# Patient Record
Sex: MALE | Race: BLACK OR AFRICAN AMERICAN
[De-identification: names, ages, dates, MRNs, and addresses within clinical notes are randomized per-mention and may not be internally consistent; named-entity substitution may affect disease eponyms.]

---

## 2017-03-28 NOTE — PD
HPI


Chief Complaint:  Medical Clearance


Time Seen by Provider:  11:52


Travel History


International Travel<30 days:  No


Contact w/Intl Traveler<30days:  No


Traveled to known affect area:  No





History of Present Illness


HPI


Patient is a 51-year-old male presents emergency department with complaint of 

chest wall pain.  Patient states that he was doing home physical therapy 2 

weeks ago, lifting weights when he felt a pop in the right chest wall.  States 

that the pain was severe initially, but then resolved.  When he is doing his 

physical therapy he has some mild ache within that same region.  Otherwise he 

is asymptomatic.  [facility] and [facility] 

them about the pain, who referred him here for cardiac evaluation given 

complaint of chest pain.





PFSH


Past Medical History


Hx Anticoagulant Therapy:  Yes (ASA)


Arthritis:  No


Asthma:  No


Anxiety:  Yes


Depression:  Yes


Heart Rhythm Problems:  No


Cancer:  No


Cardiovascular Problems:  Yes (HTN, IRREGULAR HEART BEAT)


High Cholesterol:  No


Chest Pain:  Yes


Congestive Heart Failure:  No


COPD:  No


Cerebrovascular Accident:  No


Diabetes:  Yes


Patient Takes Glucophage:  No


Diminished Hearing:  No


Deep Vein Thrombosis:  Yes


Endocrine:  No


Fibromyalgia:  Yes


Gastrointestinal Disorders:  Yes (HX OF PROBLEMS WITH RECTAL BLEEDING)


GERD:  Yes


Genitourinary:  No


Headaches:  Yes


Hiatal Hernia:  Yes


Hypertension:  Yes


Immune Disorder:  No


Implanted Vascular Access Dvce:  No


Musculoskeletal:  Yes


Neurologic:  Yes


Psychiatric:  Yes


Reproductive:  No


Respiratory:  Yes (SINUSITIS)


Immunizations Current:  Yes


Migraines:  Yes


Seizures:  No


Sleep Apnea:  No


Ulcer:  Yes (NEWLY DIAGNOSED)


Tetanus Vaccination:  > 5 Years


Influenza Vaccination:  No





Past Surgical History


Abdominal Surgery:  Yes (ABDOMINAL  LAPAROSCOPIC )


Appendectomy:  Yes (2005)


Genitourinary Surgery:  Yes (VASECTOMY)


Other Surgery:  Yes (HEMORRHOID SURGIES 2005, 2007, 2010, 2011, SINUS SURGERY, 

RT FOOT SX)





Family History


Family Myocardial Infarction:  Yes





Social History


Alcohol Use:  Yes (occassionally )


Tobacco Use:  No


Substance Use:  No





Allergies-Medications


(Allergen,Severity, Reaction):  


Coded Allergies:  


     Gabapentin (Verified  Allergy, Intermediate, VOMITING, 3/28/17)


     Oxycodone (Verified  Allergy, Mild, ITCHY, 3/28/17)


Reported Meds & Prescriptions





Reported Meds & Active Scripts


Active


Reported


Topamax (Topiramate) 50 Mg Tab 50 Mg PO DAILY


Imitrex (Sumatriptan Succinate) 50 Mg Tab 50 Mg PO ONCE PRN


     If a satisfactory response has not been obtained at 2 hours, a second


     dose may be administered


Sennosides 8.6 Mg Tab 8.6 Mg PO HS


Lyrica (Pregabalin) 150 Mg Cap 150 Mg PO BID


Prazosin (Prazosin HCl) 1 Mg Cap 4 Mg PO HS


Paxil (Paroxetine HCl) 40 Mg Tab 40 Mg PO DAILY


Pantoprazole (Pantoprazole Sodium) 40 Mg Tab 40 Mg PO BID


Mirtazapine 15 Mg Tab 15 Mg PO HS


Mobic (Meloxicam) 15 Mg Tab 15 Mg PO DAILY


Ativan (Lorazepam) 0.5 Mg Tab 0.5 Mg PO Q8H PRN


Ketotifen Opth Drops 0.025% Drops 1 Drop EACH EYE BID PRN


Proctofoam Topical (Rectal Foam) (Pramoxine Topical (Rectal Foam)) 1% Foam 1 

Applic TOPICAL QID PRN


Hydrocodone-Acetaminophen  mg Tab 1 Tab PO Q6H PRN


Colace (Docusate Sodium) 100 Mg Cap 100 Mg PO BID


D3 2000 (Cholecalciferol) 2,000 Unit Tab 2,000 Units PO BID


Cetirizine (Cetirizine HCl) 10 Mg Tab 10 Mg PO DAILY


Carafate Liq (Sucralfate) 1 Gm/10 Ml Susp 1 Gm PO QID


     on empty stomach


Baclofen 10 Mg Tab 10 Mg PO TID








Review of Systems


Except as stated in HPI:  all other systems reviewed are Neg





Physical Exam


Narrative


GENERAL: Pleasant  male in no acute distress


SKIN: Focused skin assessment warm/dry.


HEAD: Atraumatic. Normocephalic. 


EYES: No scleral icterus. No injection or drainage. 


ENT: Mucous membranes pink and moist.


NECK: Supple


CARDIOVASCULAR: Regular rate and rhythm.  No murmur appreciated.  Reproducible 

tenderness to palpation in the right sternal margin


RESPIRATORY: No accessory muscle use. Clear to auscultation. Breath sounds 

equal bilaterally. 


GASTROINTESTINAL: Abdomen soft, non-tender, nondistended. 


MUSCULOSKELETAL: No obvious deformitiesNo edema. 


NEUROLOGICAL: Awake and alert.Normal speech.


PSYCHIATRIC: Appropriate mood and affect; insight and judgment normal.





Data


Data


Last Documented VS





Vital Signs








  Date Time  Temp Pulse Resp B/P Pulse Ox O2 Delivery O2 Flow Rate FiO2


 


3/28/17 12:02  64 17 131/86 98 Room Air  


 


3/28/17 09:55 98.2       








Orders





 Electrocardiogram (3/28/17 10:42)


Chest, Pa & Lat (3/28/17 )








MDM


Medical Decision Making


Medical Screen Exam Complete:  Yes


Emergency Medical Condition:  Yes


Medical Record Reviewed:  Yes


Differential Diagnosis


51-year-old  male here with right sided chest wall pain.  This 

is clearly musculoskeletal by history and reproducible on examination.  My 

suspicion for for rib fracture, ACS, PE or dissection is exceedingly low.


Narrative Course


Twelve-lead EKG shows sinus rhythm without notable ST abnormalities, normal 

intervals.  Chest x-ray was obtained showing no acute abnormalities.  Patient 

was reassured and discharged home and cleared to return to physical therapy.





Diagnosis





 Primary Impression:  


 Chest wall pain


Referrals:  


Primary Care Physician


as needed


Patient Instructions:  Chest Wall Pain (ED), General Instructions





***Additional Instructions:


Tylenol, ibuprofen, Aleve as needed for pain.  You may resume physical therapy.


***Med/Other Pt SpecificInfo:  No Change to Meds


Disposition:  01 DISCHARGE HOME


Condition:  Stable








Aranza Granados MD Mar 28, 2017 12:03

## 2017-03-28 NOTE — RADRPT
EXAM DATE/TIME:  03/28/2017 11:31 

 

[facility] COMPARISON:     

CHEST SINGLE AP, August 27, 2016, 10:17.

 

                     

INDICATIONS :     

Patient injuried chest two weeks ago lifting weights. Patient had chest pain this morning at physical
 therapy and the 's Administration sent him to the hospital.

                     

 

MEDICAL HISTORY :            

Gastroesophageal reflux disease. Hypertension. Bronchitis.   

 

SURGICAL HISTORY :        

Hiatal hernia repair, spinal stimulator.

 

ENCOUNTER:     

Initial                                        

 

ACUITY:     

2 weeks      

 

PAIN SCORE:     

4/10

LOCATION:     Bilateral chest 

 

FINDINGS:     

PA and lateral views of the chest. Spinal stimulator leads again seen in the mid thoracic central can
al. The lungs are clear. Cardiomediastinal silhouette within normal limits. No evidence of pleural ef
fusion or pneumothorax.

 

CONCLUSION:         No acute cardiopulmonary disease identified.

 

 

 

 Bright Grover MD on March 28, 2017 at 11:56           

Board Certified Radiologist.

 This report was verified electronically.

## 2017-03-28 NOTE — EKG
Date Performed: 03/28/2017       Time Performed: 11:09:52

 

PTAGE:      51 years

 

EKG:      Sinus rhythm 

 

 LOW QRS VOLTAGE IN PRECORDIAL LEADS INFERIOR MYOCARDIAL INFARCTION ABNORMAL ECG Compared to prior tr
acing no significant change

 

DOCTOR:   Kavita Cardenas  Interpretating Date/Time  03/28/2017 22:41:17

## 2017-04-08 NOTE — RADRPT
EXAM DATE/TIME:  04/08/2017 15:40 

 

[facility] COMPARISON:     

CT ABDOMEN & PELVIS W CONTRAST, April 07, 2016, 12:59.

 

 

INDICATIONS :     

Abdomen pain; evaluate for hernia.

                      

 

IV CONTRAST:     

100 cc Omnipaque 350 (iohexol) IV 

 

 

ORAL CONTRAST:      

No oral contrast ingested.

                      

 

RADIATION DOSE:     

11.61 CTDIvol (mGy) 

 

 

MEDICAL HISTORY :     

Hypertension. Deep venous thrombosis. 

 

SURGICAL HISTORY :      

Appendectomy. Hemorrhoidectomy.Spinal cord stimulator.

 

ENCOUNTER:      

Initial

 

ACUITY:      

1 day

 

PAIN SCALE:      

5/10

 

LOCATION:       

Bilateral  abdomen

 

TECHNIQUE:     

Volumetric scanning of the abdomen and pelvis was performed.  Using automated exposure control and ad
justment of the mA and/or kV according to patient size, radiation dose was kept as low as reasonably 
achievable to obtain optimal diagnostic quality images. 

 

FINDINGS:     

 

LOWER LUNGS:     

The visualized lower lungs are clear.

 

LIVER:     

Homogeneous density without lesion.  There is no dilation of the biliary tree.  No calcified gallston
es.

 

SPLEEN:     

Normal size without lesion.

 

PANCREAS:     

Within normal limits.

 

KIDNEYS:     

Normal in size and shape.  There is no mass, stone or hydronephrosis.

 

ADRENAL GLANDS:     

Within normal limits.

 

VASCULAR:     

There is no aortic aneurysm.

 

BOWEL/MESENTERY:     

The stomach, small bowel, and colon demonstrate no acute abnormality.  There is no free intraperitone
al air or fluid.

 

ABDOMINAL WALL:     

Within normal limits. No evidence of hernia.

 

RETROPERITONEUM:     

There is no lymphadenopathy. 

 

BLADDER:     

No wall thickening or mass. 

 

REPRODUCTIVE:     

Within normal limits.

 

INGUINAL:     

There is no lymphadenopathy or hernia. 

 

MUSCULOSKELETAL:     

Within normal limits for patient age. Spinal stimulator noted in the right flank. Evidence of previou
s lumbar spinal surgery with laminectomy at the lower lumbar spine level.

 

No significant change compared to prior study.

 

CONCLUSION:     

Unremarkable and stable CT scan of the abdomen and pelvis compared to the prior study.

 

 

 

 Jesus Craig MD on April 08, 2017 at 16:04           

Board Certified Radiologist.

 This report was verified electronically.

## 2017-04-08 NOTE — PD
HPI


Chief Complaint:  Abdominal Pain


Time Seen by Provider:  13:51


Travel History


International Travel<30 days:  No


Contact w/Intl Traveler<30days:  No


Traveled to known affect area:  No





History of Present Illness


HPI


51-year-old black male presents to emergency department accompanied by his wife 

for evaluation of left groin pain.  He states that he was working changing out 

the litter boxes when he developed sudden severe pain in his left groin.  He 

states that this is a similar pain that he has had in the past when he has had 

a hernia.  He states that he was diagnosed with a hernia according to his 

doctor as well as an ER doctor in the past.  He was seen by a specialist in 

[place] who refuted this.  He states that he did not feel that his pain was 

related to her hernia.  He states that he had seen that doctor for over a year 

but no surgery was ever performed.  He states the pain gradually improved but 

no diagnosis was truly given at that time.  He states that the same pain that he

's had in the past which he felt was related to the hernia.  He denies any 

fever or chills.  No nausea vomiting.  No abdominal pain.  No scrotal pain or 

swelling.  No dysuria or frequency.  No hematuria.  He states he does have a 

chronic back pain but it is not any worse than usual.  He denies any pain in 

the hip.  No direct trauma.  Patient states the pain is severe.  Worse with 

movement.  Some relief of the remaining still.  States the pain is a 10/10.





PFSH


Past Medical History


*** Narrative Medical


Chronic back pain, fibromyalgia, migraines, Hiatal hernia, GERD, depression, 

PTSD, DVT, appendectomy


Hx Anticoagulant Therapy:  No


Arthritis:  No


Asthma:  No


Anxiety:  Yes


Depression:  Yes


Heart Rhythm Problems:  No


Cancer:  No


Cardiovascular Problems:  No


High Cholesterol:  No


Chemotherapy:  No


Chest Pain:  Yes


Congestive Heart Failure:  No


COPD:  No


Cerebrovascular Accident:  No


Diabetes:  No


Diminished Hearing:  No


Deep Vein Thrombosis:  Yes


Endocrine:  No


Fibromyalgia:  Yes


Gastrointestinal Disorders:  Yes (HX OF PROBLEMS WITH RECTAL BLEEDING)


GERD:  Yes


Genitourinary:  No


Headaches:  Yes


Hiatal Hernia:  Yes


Hypertension:  Yes


Immune Disorder:  No


Implanted Vascular Access Dvce:  No


Musculoskeletal:  Yes


Neurologic:  Yes


Psychiatric:  Yes


Reproductive:  No


Respiratory:  Yes


Immunizations Current:  Yes


Migraines:  Yes


Seizures:  No


Sleep Apnea:  No


Ulcer:  Yes (NEWLY DIAGNOSED)


Tetanus Vaccination:  Unknown


Influenza Vaccination:  No





Past Surgical History


*** Narrative Surgical


Vastectomy, ruptured appendix


Abdominal Surgery:  Yes (ABDOMINAL  LAPAROSCOPIC )


Appendectomy:  Yes (2005)


Genitourinary Surgery:  Yes (VASECTOMY)


Hysterectomy:  No


Other Surgery:  Yes (HEMORRHOID SURGIES 2005, 2007, 2010, 2011, SINUS SURGERY, 

RT FOOT SX)





Social History


Alcohol Use:  Yes (occaisional)


Tobacco Use:  No


Substance Use:  No





Allergies-Medications


(Allergen,Severity, Reaction):  


Coded Allergies:  


     Gabapentin (Verified  Allergy, Intermediate, VOMITING, 4/8/17)


     Oxycodone (Verified  Allergy, Mild, ITCHY, 4/8/17)


Reported Meds & Prescriptions





Reported Meds & Active Scripts


Active


Reported


Buspirone (Buspirone HCl) 5 Mg Tab Unknown Dose PO TID


Topamax (Topiramate) 50 Mg Tab 50 Mg PO DAILY


Imitrex (Sumatriptan Succinate) 50 Mg Tab 50 Mg PO DAILY PRN


     If a satisfactory response has not been obtained at 2 hours, a second


     dose may be administered


Sennosides 8.6 Mg Tab 8.6 Mg PO HS


Lyrica (Pregabalin) 150 Mg Cap 150 Mg PO BID


Prazosin (Prazosin HCl) 1 Mg Cap 4 Mg PO HS


Paxil (Paroxetine HCl) 40 Mg Tab 40 Mg PO DAILY


Pantoprazole (Pantoprazole Sodium) 40 Mg Tab 40 Mg PO BID


Mirtazapine 15 Mg Tab 15 Mg PO HS


Mobic (Meloxicam) 15 Mg Tab 15 Mg PO DAILY


Ativan (Lorazepam) 0.5 Mg Tab 0.5 Mg PO Q8H PRN


ZyrTEC Itchy Eye Opth Drops (Ketotifen Opth Drops) 0.025% Drops 1 Drop EACH EYE 

BID PRN


Proctofoam Topical (Rectal Foam) (Pramoxine Topical (Rectal Foam)) 1% Foam 1 

Applic TOPICAL QID PRN


Hydrocodone-Acetaminophen  mg Tab 1 Tab PO Q6H PRN


Colace (Docusate Sodium) 100 Mg Cap 100 Mg PO BID


D3 2000 (Cholecalciferol) 2,000 Unit Tab 2,000 Units PO BID


Cetirizine (Cetirizine HCl) 10 Mg Tab 10 Mg PO DAILY


Carafate Liq (Sucralfate) 1 Gm/10 Ml Susp 1 Gm PO QID


     on empty stomach


Baclofen 10 Mg Tab 10 Mg PO TID








Review of Systems


Except as stated in HPI:  all other systems reviewed are Neg


General / Constitutional:  No: Fever, Chills


Eyes:  No: Blurred Vision, Photophobia


HENT:  No: Headaches, Vertigo


Cardiovascular:  No: Chest Pain or Discomfort, Palpitations


Respiratory:  No: Cough, Shortness of Breath


Gastrointestinal:  No: Nausea, Vomiting, Abdominal Pain


Genitourinary:  No: Frequency, Dysuria, Hematuria


Musculoskeletal:  Positive: Arthralgias, Pain


Neurologic:  No: Weakness, Paresthesia





Physical Exam


Narrative


GENERAL:  Well-developed, well-nourished in no apparent distress. Nontoxic 

appearing.


HEAD: Normocephalic, atraumatic.


EYES: Pupils equal round and reactive. Extraocular motions intact. No scleral 

icterus. No injection or drainage. 


ENT: Nose clear. Throat without erythema, tonsillar hypertrophy or exudate. 

Uvula midline. Airway patent.


NECK: Trachea midline. Supple, nontender, moves head freely.  No central bony 

tenderness or spasm.


CARDIOVASCULAR: Regular rate and rhythm without murmurs, gallops, or rubs. 


RESPIRATORY: Clear to auscultation. Breath sounds equal bilaterally. No wheezes

, rales, or rhonchi.  


GASTROINTESTINAL: Abdomen soft, non-tender, nondistended. No hepato-splenomegaly

, or palpable masses. No guarding.  I'm able to palpate deeply in his abdomen 

which causes no discomfort.


EXTREMITIES: No clubbing, cyanosis, or edema. No joint tenderness.  Patient has 

intact dorsalis pedis pulses.  Equally.  He has no pain in his feet, calves, 

thighs.  Patient has reproducible tenderness in the left groin.  There is no 

obvious swelling, erythema or warmth.


GENITOURINARY:  Circumcised. Testes descended bilaterally without evidence of 

rotation.  There is no scrotal swelling or testicular tenderness.  No lesions 

or erythema.  No urethral discharge.  I see no obvious hernia on exam while the 

patient is laying supine.


BACK: Nontender without deformity. No flank tenderness.


NEUROLOGICAL: Awake, alert and oriented x 3 .Cranial nerves grossly intact.  

Motor and sensory grossly within normal limits. Normal speech.





Data


Data


Last Documented VS





Vital Signs








  Date Time  Temp Pulse Resp B/P Pulse Ox O2 Delivery O2 Flow Rate FiO2


 


4/8/17 14:52     97 Room Air  


 


4/8/17 12:46 98.1 80 24 132/85    








Orders





 Complete Blood Count With Diff (4/8/17 13:48)


Comprehensive Metabolic Panel (4/8/17 13:48)


Urinalysis - C+S If Indicated (4/8/17 13:48)


Ct Abd/Pel W Iv Contrast(Rout) (4/8/17 13:48)


Iv Access Insert/Monitor (4/8/17 13:48)


Ecg Monitoring (4/8/17 13:48)


Oximetry (4/8/17 13:48)


Sodium Chloride 0.9% Flush (Ns Flush) (4/8/17 14:00)


Sodium Chlor 0.9% 1000 Ml Inj (Ns 1000 M (4/8/17 14:00)


Ketorolac Inj (Toradol Inj) (4/8/17 14:00)


Metoclopramide Inj (Reglan Inj) (4/8/17 14:00)


Diphenhydramine Inj (Benadryl Inj) (4/8/17 14:00)


Iohexol 350 Inj (Omnipaque 350 Inj) (4/8/17 15:43)





Labs





 Laboratory Tests








Test 4/8/17 4/8/17





 14:10 14:11


 


White Blood Count 5.8 TH/MM3 


 


Red Blood Count 4.78 MIL/MM3 


 


Hemoglobin 13.9 GM/DL 


 


Hematocrit 40.8 % 


 


Mean Corpuscular Volume 85.3 FL 


 


Mean Corpuscular Hemoglobin 29.1 PG 


 


Mean Corpuscular Hemoglobin 34.1 % 





Concent  


 


Red Cell Distribution Width 15.3 % 


 


Platelet Count 155 TH/MM3 


 


Mean Platelet Volume 10.8 FL 


 


Neutrophils (%) (Auto) 57.4 % 


 


Lymphocytes (%) (Auto) 29.4 % 


 


Monocytes (%) (Auto) 9.6 % 


 


Eosinophils (%) (Auto) 2.9 % 


 


Basophils (%) (Auto) 0.7 % 


 


Neutrophils # (Auto) 3.3 TH/MM3 


 


Lymphocytes # (Auto) 1.7 TH/MM3 


 


Monocytes # (Auto) 0.6 TH/MM3 


 


Eosinophils # (Auto) 0.2 TH/MM3 


 


Basophils # (Auto) 0.0 TH/MM3 


 


CBC Comment DIFF FINAL  


 


Differential Comment   


 


Sodium Level 139 MEQ/L 


 


Potassium Level 4.3 MEQ/L 


 


Chloride Level 106 MEQ/L 


 


Carbon Dioxide Level 26.7 MEQ/L 


 


Anion Gap 6 MEQ/L 


 


Blood Urea Nitrogen 15 MG/DL 


 


Creatinine 0.97 MG/DL 


 


Estimat Glomerular Filtration 98 ML/MIN 





Rate  


 


Random Glucose 83 MG/DL 


 


Calcium Level 8.4 MG/DL 


 


Total Bilirubin 0.4 MG/DL 


 


Aspartate Amino Transf 27 U/L 





(AST/SGOT)  


 


Alanine Aminotransferase 20 U/L 





(ALT/SGPT)  


 


Alkaline Phosphatase 75 U/L 


 


Total Protein 7.5 GM/DL 


 


Albumin 3.7 GM/DL 


 


Urine Color  YELLOW 


 


Urine Turbidity  CLEAR 


 


Urine pH  5.5 


 


Urine Specific Gravity  1.017 


 


Urine Protein  NEG mg/dL


 


Urine Glucose (UA)  NEG mg/dL


 


Urine Ketones  NEG mg/dL


 


Urine Occult Blood  NEG 


 


Urine Nitrite  NEG 


 


Urine Bilirubin  NEG 


 


Urine Urobilinogen  LESS THAN 2.0





  MG/DL


 


Urine Leukocyte Esterase  NEG 


 


Urine RBC  LESS THAN 1





  /hpf


 


Urine WBC  LESS THAN 1





  /hpf


 


Urine Squamous Epithelial  1 /hpf





Cells  


 


Urine Mucus  FEW /lpf


 


Microscopic Urinalysis Comment  CULT NOT





  INDICATED











MDM


Medical Decision Making


Medical Screen Exam Complete:  Yes


Emergency Medical Condition:  Yes


Medical Record Reviewed:  Yes


Interpretation(s)


CBC & BMP Diagram


4/8/17 14:10








UA: Negative


Last 24 hours Impressions








Abdomen/Pelvis CT 4/8/17 1348 Signed





Impressions: 





 Service Date/Time:  Saturday, April 8, 2017 15:40 - CONCLUSION:  Unremarkable 





 and stable CT scan of the abdomen and pelvis compared to the prior study.     





 Jesus Craig MD 








Differential Diagnosis


Differential diagnosis: Hernia, lumbar radiculopathy, PVD, ureterolithiasis


Narrative Course


IV access is obtained.  Laboratory testing sent for analysis.  CAT scan of the 

abdomen.  Patient's given a liter bolus of normal saline, Benadryl 25 mg IV, 

Reglan 10 mg IV and Toradol 30 mg IV.





The patient's CBC, chemistry and CAT scan are unremarkable.  I see no source of 

the patient's complaint of groin pain.  I've discussed this at length with the 

patient and I suspect this most likely will be a radicular pain from lower back 

etiology.  He has no abdominal pain.  He has no testicular pain.  His labs are 

normal along with a CAT scan.  The patient has had improvement of his pain with 

Toradol and Reglan.  The patient's consider medically stable for discharge.  He'

ll follow-up with his doctor on Monday.





This is left groin pain





Diagnosis





 Primary Impression:  


 Left groin pain


Patient Instructions:  General Instructions





***Additional Instructions:


Rest.


Ice for the next 3 days followed by heat .


Robaxin and Voltaren.


Follow-up with a primary care doctor on Monday.


Return to the ER for emergencies.


***Med/Other Pt SpecificInfo:  Prescription(s) given


Scripts


Methocarbamol (Robaxin)750 Mg Tab1,500 Mg PO TID  7 Days


   Prov:Marilyn Murillo MD         4/8/17 


Diclofenac Sodium DR 50 Mg Tabdr50 Mg PO TID  #21 TAB


   Prov:Marilyn Murillo MD         4/8/17


Disposition:  01 DISCHARGE HOME


Condition:  Stable








Garett Elliott Apr 8, 2017 13:58

## 2017-08-20 NOTE — RADRPT
EXAM DATE/TIME:  08/20/2017 19:19 

 

[facility] COMPARISON:     

CHEST PA & LAT, March 28, 2017, 11:31.

 

                     

INDICATIONS :     

Left arm and chest pain.

                     

 

MEDICAL HISTORY :            

Gastroesophageal reflux disease. Hypertension. Bronchitis.   

 

SURGICAL HISTORY :        

Nissen fundoplication. Hiatal hernia repair, spinal stimulator.

 

ENCOUNTER:     

Initial                                        

 

ACUITY:     

1 day      

 

PAIN SCORE:     

4/10

 

LOCATION:     

Left chest 

 

FINDINGS:     

The lungs are clear without infiltrate, nodule, or mass.  There is no appreciable pleural effusion fo
r technique.  Heart and mediastinum are unremarkable.

 

CONCLUSION:         

No acute cardiopulmonary disease.

 

 

 

 JA Adair MD on August 20, 2017 at 19:36           

Board Certified Radiologist.

 This report was verified electronically.

## 2017-08-20 NOTE — EKG
Date Performed: 08/20/2017       Time Performed: 19:33:04

 

PTAGE:      51 years

 

EKG:      Sinus rhythm 

 

 PROBABLE INFERIOR MYOCARDIAL INFARCTION ABNORMAL ECG

 

PREVIOUS TRACING       : 03/28/2017 11.09 No significant change from previous tracing noted.

 

DOCTOR:   Brandin Avila  Interpretating Date/Time  08/20/2017 21:36:33

## 2017-08-20 NOTE — PD
HPI


Chief Complaint:  Headache


Time Seen by Provider:  18:58


Travel History


International Travel<30 days:  No


Contact w/Intl Traveler<30days:  No


Traveled to known affect area:  No





History of Present Illness


HPI


50yo M with PMH of GERD, HTN, anxiety, migraine headache, PTSD here with 

multiple complaints.  Pt has been having migraine headaches for 3-4 weeks.  [facility] 

[facility] and is [facility] topiramate and sumatriptan for the migraine 

headache.  Feels like his migraine headache and associated with photophobia.  

States that he noticed blood in his left ear since yesterday and his left eye 

has some pain today.  Denies any trauma to his eye or head trauma.  Denies any 

eye itching, pain with eye movement.  Denies any fever, neck pain, focal 

weakness or numbness, vomiting, abdominal pain or sob.  Pt also complains of 

midsternal chest pain that just started a few minutes ago radiating his left 

arm.  States he had cardiac cath 3 years ago and it was negative.  Last stress 

test was also 3 years ago.  Does not have a cardiologist.  Occasionally takes 

aspirin and did not take it today.





PFSH


Past Medical History


Hx Anticoagulant Therapy:  No


Arthritis:  No


Asthma:  No


Anxiety:  Yes


Depression:  Yes


Heart Rhythm Problems:  No


Cancer:  No


Cardiovascular Problems:  No


High Cholesterol:  No


Chemotherapy:  No


Chest Pain:  Yes


Congestive Heart Failure:  No


COPD:  No


Cerebrovascular Accident:  No


Diabetes:  No


Diminished Hearing:  No


Deep Vein Thrombosis:  Yes


Endocrine:  No


Fibromyalgia:  Yes


Gastrointestinal Disorders:  Yes (HX OF PROBLEMS WITH RECTAL BLEEDING)


GERD:  Yes


Genitourinary:  No


Headaches:  Yes


Hiatal Hernia:  Yes


Hypertension:  Yes


Immune Disorder:  No


Implanted Vascular Access Dvce:  No


Musculoskeletal:  Yes


Neurologic:  Yes


Psychiatric:  Yes


Reproductive:  No


Respiratory:  Yes


Immunizations Current:  Yes


Migraines:  Yes


Seizures:  No


Sleep Apnea:  No


Ulcer:  Yes (NEWLY DIAGNOSED)





Past Surgical History


Abdominal Surgery:  Yes (ABDOMINAL  LAPAROSCOPIC )


Appendectomy:  Yes (2005)


Genitourinary Surgery:  Yes (VASECTOMY)


Hysterectomy:  No


Other Surgery:  Yes (HEMORRHOID SURGIES 2005, 2007, 2010, 2011, SINUS SURGERY, 

RT FOOT SX)





Social History


Alcohol Use:  Yes (occaisional)


Tobacco Use:  No


Substance Use:  No





Allergies-Medications


(Allergen,Severity, Reaction):  


Coded Allergies:  


     gabapentin (Unverified  Allergy, Intermediate, VOMITING, 8/20/17)


     oxycodone (Unverified  Allergy, Mild, ITCHY, 8/20/17)


Reported Meds & Prescriptions





Reported Meds & Active Scripts


Active


Reported


Buspirone (Buspirone HCl) Unknown Strength Tab 1 Tab PO TID


Topamax (Topiramate) 50 Mg Tab 50 Mg PO DAILY


Imitrex (Sumatriptan Succinate) 50 Mg Tab 50 Mg PO DAILY PRN


     If a satisfactory response has not been obtained at 2 hours, a second


     dose may be administered


Sennosides 8.6 Mg Tab 8.6 Mg PO HS


Lyrica (Pregabalin) 150 Mg Cap 150 Mg PO BID


Prazosin (Prazosin HCl) 1 Mg Cap 4 Mg PO HS


Pantoprazole (Pantoprazole Sodium) 40 Mg Tab 40 Mg PO BID


Mirtazapine 15 Mg Tab 15 Mg PO HS


Mobic (Meloxicam) 15 Mg Tab 15 Mg PO DAILY


Ativan (Lorazepam) 0.5 Mg Tab 0.5 Mg PO Q8H PRN


ZyrTEC Itchy Eye Opth Drops (Ketotifen Opth Drops) 0.025% Drops 1 Drop EACH EYE 

BID PRN


Proctofoam Topical (Rectal Foam) (Pramoxine Topical (Rectal Foam)) 1% Foam 1 

Applic TOPICAL QID PRN


Hydrocodone-Acetaminophen  mg Tab 1 Tab PO Q6H PRN


Cetirizine (Cetirizine HCl) 10 Mg Tab 10 Mg PO DAILY


Carafate Liq (Sucralfate) 1 Gm/10 Ml Susp 1 Gm PO QID


     on empty stomach


Baclofen 10 Mg Tab 10 Mg PO TID








Review of Systems


Except as stated in HPI:  all other systems reviewed are Neg





Physical Exam


Narrative


GENERAL: 50yo M in mild distress.  


SKIN: Focused skin assessment warm/dry.


HEAD: Atraumatic. Normocephalic. 


EYES: Pupils equal and round at 4mm bilaterally.  EOMI.  Left eye with 

subconjunctival hemorrhage in right sclera.  IOP normal with average of 16.  

Visual acuity 20/20.  No fluroescein uptake in left eye under Woods lamp exam.


ENT: No nasal bleeding or discharge.  Mucous membranes pink and moist.


NECK: Trachea midline. No JVD. 


CARDIOVASCULAR: Regular rate and rhythm.  No murmur appreciated.


RESPIRATORY: No accessory muscle use. Clear to auscultation. Breath sounds 

equal bilaterally. 


GASTROINTESTINAL: Abdomen soft, non-tender, nondistended. No rebound tenderness 

or guarding.


MUSCULOSKELETAL: No obvious deformities. No clubbing.  No cyanosis.  No edema. 


NEUROLOGICAL: Awake and alert. No obvious cranial nerve deficits.  Motor 

grossly within normal limits. Normal speech.





Data


Data


Last Documented VS





Vital Signs








  Date Time  Temp Pulse Resp B/P (MAP) Pulse Ox O2 Delivery O2 Flow Rate FiO2


 


8/20/17 19:53     97 Nasal Cannula 2.00 


 


8/20/17 19:40  69 18     


 


8/20/17 19:39        


 


8/20/17 17:58 98.0       








Orders





 Orders


Electrocardiogram (8/20/17 19:10)


Basic Metabolic Panel (Bmp) (8/20/17 19:10)


Complete Blood Count With Diff (8/20/17 19:10)


Magnesium (Mg) (8/20/17 19:10)


Prothrombin Time / Inr (Pt) (8/20/17 19:10)


Act Partial Throm Time (Ptt) (8/20/17 19:10)


Troponin I (8/20/17 19:10)


Chest, Single Ap (8/20/17 19:10)


Ecg Monitoring (8/20/17 19:10)


Bilateral Bp Monitoring (8/20/17 19:10)


Iv Access Insert/Monitor (8/20/17 19:10)


Oximetry (8/20/17 19:10)


Oxygen Administration (8/20/17 19:10)


Nitroglycerin Sl (Nitrostat Sl) (8/20/17 19:15)


Ketorolac Inj (Toradol Inj) (8/20/17 19:15)


Aspirin Chew (Aspirin Chew) (8/20/17 19:10)


Proparacaine 0.5% Opth Soln (Alcaine 0.5 (8/20/17 19:30)





Labs





Laboratory Tests








Test


  8/20/17


19:30 8/20/17


19:50


 


Prothrombin Time 12.0 SEC  


 


Prothromb Time International


Ratio 1.1 RATIO 


  


 


 


Activated Partial


Thromboplast Time 26.0 SEC 


  


 


 


White Blood Count  7.4 TH/MM3 


 


Red Blood Count  4.89 MIL/MM3 


 


Hemoglobin  13.8 GM/DL 


 


Hematocrit  42.5 % 


 


Mean Corpuscular Volume  86.8 FL 


 


Mean Corpuscular Hemoglobin  28.3 PG 


 


Mean Corpuscular Hemoglobin


Concent 


  32.6 % 


 


 


Red Cell Distribution Width  15.7 % 


 


Platelet Count  154 TH/MM3 


 


Mean Platelet Volume  10.8 FL 


 


Neutrophils (%) (Auto)  49.6 % 


 


Lymphocytes (%) (Auto)  35.5 % 


 


Monocytes (%) (Auto)  11.0 % 


 


Eosinophils (%) (Auto)  2.9 % 


 


Basophils (%) (Auto)  1.0 % 


 


Neutrophils # (Auto)  3.7 TH/MM3 


 


Lymphocytes # (Auto)  2.6 TH/MM3 


 


Monocytes # (Auto)  0.8 TH/MM3 


 


Eosinophils # (Auto)  0.2 TH/MM3 


 


Basophils # (Auto)  0.1 TH/MM3 


 


CBC Comment  DIFF FINAL 


 


Differential Comment   


 


Blood Urea Nitrogen  19 MG/DL 


 


Creatinine  1.24 MG/DL 


 


Random Glucose  117 MG/DL 


 


Calcium Level  8.5 MG/DL 


 


Magnesium Level  2.3 MG/DL 


 


Sodium Level  138 MEQ/L 


 


Potassium Level  3.5 MEQ/L 


 


Chloride Level  109 MEQ/L 


 


Carbon Dioxide Level  20.5 MEQ/L 


 


Anion Gap  9 MEQ/L 


 


Estimat Glomerular Filtration


Rate 


  74 ML/MIN 


 


 


Troponin I


  


  LESS THAN 0.02


NG/ML











MDM


Medical Decision Making


Medical Screen Exam Complete:  Yes


Emergency Medical Condition:  Yes


Interpretation(s)


EKG: NSR 71bpm.  Normal axis.  Q wave III.  TWI III.


Differential Diagnosis


Atypical chest pain vs. ACS vs. GERD vs. anxiety vs. migraine headache vs. 

subconjunctival hemorrhage


Narrative Course


50yo M with multiple complaints.  Headache has been there for weeks and feels 

like his usual migraine headache.  No red flags.  Chest pain is atypical but pt 

has risks factors such as age, HTN.  Pt las had cardiac cath in 2013 which was 

normal.  Also was admitted to chest pain center 8/2016 but no stress test was 

performed.  It has been almost 4 years since last stress test and cath.  Pt is 

mainly here for the headache and subconjunctival hemorrhage in left eye but 

does complain of chest pain while I was evaluating him.  Pt given aspirin and 

sublingual nitro as needed for chest pain.  Pt also given toradol for headache.

  Pt reevaluated at bedside and states headache has resolved.  Labs reviewed, 

no leukocytosis.  Troponin negative.  CXR negative.  Explain to pt that 

subconjunctival hemorrhage will get better on its own but to follow up with 

ophthalmology as outpatient if it does not.  Also explained to pt to follow up 

with PMD for his migraine headache.  Will admit him to chest pain center for 

serial EKG and cardiac enzymes.





Diagnosis





 Primary Impression:  


 OTHER CHEST PAIN





Admitting Information


Admitting Physician Requests:  Felicia Cain DO Aug 20, 2017 19:18

## 2017-08-21 NOTE — HHI.DCPOC
Discharge Care Plan


Diagnosis:  


(1) Headache, chronic migraine without aura


(2) Atypical chest pain


(3) GERD (gastroesophageal reflux disease)


Goals to Promote Your Health


* To prevent worsening of your condition and complications


* To maintain your health at the optimal level


Directions to Meet Your Goals


*** Take your medications as prescribed


*** Follow your dietary instruction


*** Follow activity as directed








*** Keep your appointments as scheduled


*** Take your immunizations and boosters as scheduled


*** If your symptoms worsen call your PCP, if no PCP go to Urgent Care Center 

or Emergency Room***


*** Smoking is Dangerous to Your Health. Avoid second hand smoke***


***Call the 24-hour hour crisis hotline for domestic abuse at [phone]***











Michelle Winkler Aug 21, 2017 12:07

## 2017-08-21 NOTE — HHI.HP
HPI


Primary Care Physician


[facility]


Chief Complaint


Chest pain and Eye pain


History of Present Illness


51-year-old male with history of GERD, migraines, PTSD presents to emergency 

room for further evaluation of left thigh pain.  Reports 2 days ago noticed 

discoloration in left eye, them at 3 pm yesterday developed sharp shooting pain 

in left eye.  [facility] was advised 

to come to the emergency room for further evaluation.  While being evaluated by 

ER physician(6:30-7:00pm ) developed left anterior chest pain. Described as 

sharp shooting pain with radiation to his left arm.  Severity initially was 10/

10.  Currently 5/10.  Duration has been constant although is decreased in 

intensity.  No associated symptoms of nausea, vomiting, shortness breath, or 

diaphoresis.  Did not hurt to take a deep breath.  Precipitating factors he 

believes maybe GERD nissenwrap last year due to severe GERD.  No known 

relieving factors. 


In regards to his eye complaint, left eye discomfort consistent, denies blurred 

vision, change in vision, or injury to eye.





Review of Systems


General: No fatigue,weakness, fever, chills, recent illness, or change in 

appetite.


HEENT: Chronic migraines x4 weeks, history of migraines and taking prophylactic 

medications. As stated above. No vision changes. Chronic sinusitis. No 

dysphasia.


CV: As stated above.  No palpitations, intermittent leg pain, or dizziness. 


RESP: No SOB, cough, wheeze, or recent URI.


GI: No nausea, vomiting, bowel changes, diarrhea, or constipation.  


: No dysuria, urgency, frequency.


EXT: No lower leg edema, no paraesthesias


MS: No discomfort or change in ROM


NEURO: No change in memory, dizziness, difficulty with balance, LOC, motor/

sensory deficits


PSYCH: History of PTSD, no current depression or anxiety


SKIN: No rashes, no concerning lesions





Past Family Social History


Allergies:  


Coded Allergies:  


     gabapentin (Unverified  Allergy, Intermediate, VOMITING, 17)


     oxycodone (Unverified  Allergy, Mild, ITCHY, 17)


Past Medical History


GERD, PTSD, chronic sinusitis, migraines, hyperlipidemia, BPH


Past Surgical History


Lumbar surgeries 4, sinus surgeries, hemorrhoidectomy, appendectomy, 

Nissenwrap for GERD


Reported Medications


Active


Reported


Buspirone (Buspirone HCl) Unknown Strength Tab 1 Tab PO TID


Topamax (Topiramate) 50 Mg Tab 50 Mg PO DAILY


Imitrex (Sumatriptan Succinate) 50 Mg Tab 50 Mg PO DAILY PRN


     If a satisfactory response has not been obtained at 2 hours, a second


     dose may be administered


Sennosides 8.6 Mg Tab 8.6 Mg PO HS


Lyrica (Pregabalin) 150 Mg Cap 150 Mg PO BID


Prazosin (Prazosin HCl) 1 Mg Cap 4 Mg PO HS


Pantoprazole (Pantoprazole Sodium) 40 Mg Tab 40 Mg PO BID


Mirtazapine 15 Mg Tab 15 Mg PO HS


Mobic (Meloxicam) 15 Mg Tab 15 Mg PO DAILY


Ativan (Lorazepam) 0.5 Mg Tab 0.5 Mg PO Q8H PRN


ZyrTEC Itchy Eye Opth Drops (Ketotifen Opth Drops) 0.025% Drops 1 Drop EACH EYE 

BID PRN


Proctofoam Topical (Rectal Foam) (Pramoxine Topical (Rectal Foam)) 1% Foam 1 

Applic TOPICAL QID PRN


Hydrocodone-Acetaminophen  mg Tab 1 Tab PO Q6H PRN


Cetirizine (Cetirizine HCl) 10 Mg Tab 10 Mg PO DAILY


Carafate Liq (Sucralfate) 1 Gm/10 Ml Susp 1 Gm PO QID


     on empty stomach


Baclofen 10 Mg Tab 10 Mg PO TID


Active Ordered Medications





Current Medications








 Medications


  (Trade)  Dose


 Ordered  Sig/Brunilda


 Route  Start Time


 Stop Time Status Last Admin


 


  (Nitrostat Sl)  0.4 mg  Q5M  PRN


 SL  17 19:15


    17 20:15


 


 


  (NS Flush)  2 ml  UNSCH  PRN


 IV FLUSH  17 23:45


     


 


 


  (NS Flush)  2 ml  BID


 IV FLUSH  17 09:00


     


 








Family History


Noncontributory for early onset cardiovascular disease.


Social History


No known diabetes or hypertension.  Reports hyperlipidemia not currently on 

medication.


Lifelong nonsmoker.  Denies any alcohol or illegal drug use.


Endorses an active lifestyle exercises Monday, Wednesday,  walking on a 

treadmill.





Past cardiac testing


2013-Lexiscan 1. Fixed thinning involving the anterior wall, apex, and 

inferior wall, suggesting infarcts in the LAD and RCA distribtions. Clinical 

correlation is recommended. 2. Global hypokinesis with left ventricular 

ejection fraction equally 31 %. 3. No reversible thinning to suggest ischemia. 

10/18/2013-Cardiac catheterization (Dr. Herrera)-angiography normal coronary 

arterities, low-normal left ventricular systolic function estimated at 50%, no 

significant mitral regurgitation noted.





Physical Exam


Vital Signs





Vital Signs








  Date Time  Temp Pulse Resp B/P (MAP) Pulse Ox O2 Delivery O2 Flow Rate FiO2


 


17 09:16 98.3 61 18 114/82 (93) 98   


 


17 09:04        


 


17 08:28 97.6 69 18 129/75 (93) 99 Room Air  


 


17 08:28  69 18  98 Room Air  


 


17 04:53  60 20 126/58 (80) 99 Room Air  


 


17 19:53     97 Nasal Cannula 2.00 


 


17 19:40  69 18  98 Room Air  


 


17 19:39   18  97 Room Air  


 


17 19:27    129/75 (93)    





    132/77 (95)    


 


17 17:58 98.0 89 15 133/77 (95) 100   








Physical Exam


GENERAL: Alert WN, WD, NAD, pleasant,  male


HEAD: NC, AT


EYES: Left orbital erythema covering half of eye appears to be a conjunctiva 

hemorrhage, pupils equal and round, intact 


ENT: Mucous membranes pink and moist


CV: RRR, without murmur, rub, gallop, no JVD, S1-S2 no S3-S4.  No carotid 

bruits.


RESP: Clear lungs throughout bilateral, no crackles, wheeze, rhonchi, 

symmetrical chest rise, nonlabored, able to speak in full sentences


ABD: Soft, NT, ND, no masses, positive bowel tones


EXT: Pulses +24, no dependent edema


MS: Normal tone 4 extremities, nontender, no obvious deformities, full range 

of motion


NEURO: CN II through CN XII grossly intact, motor strength 5/5, gait WNL


PSYCH: A+O 3, pleasant affect, appropriate speech, appropriate mood and affect

, insight and judgment


SKIN: Normal turgor, normal texture, no lesions, no rashes


Laboratory





Laboratory Tests








Test


  17


19:30 17


19:50 17


00:43 17


04:20


 


Prothrombin Time 12.0    


 


Prothromb Time International


Ratio 1.1 


  


  


  


 


 


Activated Partial


Thromboplast Time 26.0 


  


  


  


 


 


White Blood Count  7.4   


 


Red Blood Count  4.89   


 


Hemoglobin  13.8   


 


Hematocrit  42.5   


 


Mean Corpuscular Volume  86.8   


 


Mean Corpuscular Hemoglobin  28.3   


 


Mean Corpuscular Hemoglobin


Concent 


  32.6 


  


  


 


 


Red Cell Distribution Width  15.7   


 


Platelet Count  154   


 


Mean Platelet Volume  10.8   


 


Neutrophils (%) (Auto)  49.6   


 


Lymphocytes (%) (Auto)  35.5   


 


Monocytes (%) (Auto)  11.0   


 


Eosinophils (%) (Auto)  2.9   


 


Basophils (%) (Auto)  1.0   


 


Neutrophils # (Auto)  3.7   


 


Lymphocytes # (Auto)  2.6   


 


Monocytes # (Auto)  0.8   


 


Eosinophils # (Auto)  0.2   


 


Basophils # (Auto)  0.1   


 


CBC Comment  DIFF FINAL   


 


Differential Comment     


 


Blood Urea Nitrogen  19   


 


Creatinine  1.24   


 


Random Glucose  117   


 


Calcium Level  8.5   


 


Magnesium Level  2.3   


 


Sodium Level  138   


 


Potassium Level  3.5   


 


Chloride Level  109   


 


Carbon Dioxide Level  20.5   


 


Anion Gap  9   


 


Estimat Glomerular Filtration


Rate 


  74 


  


  


 


 


Troponin I  LESS THAN 0.02  LESS THAN 0.02  LESS THAN 0.02 


 


Total Creatine Kinase   216  222 


 


Creatine Kinase MB   4.3  4.2 








Result Diagram:  


17





Imaging


Chest xray-interpreted by radiologist as no acute coronary pulmonary disease.


Course


EKG


Normal sinus rhythm, possible inferior MI, no st t segment changes





Caprini VTE Risk Assessment


Caprini VTE Risk Assessment:  No/Low Risk (score <= 1)


Caprini Risk Assessment Model











 Point Value = 1          Point Value = 2  Point Value = 3  Point Value = 5


 


Age 41-60


Minor surgery


BMI > 25 kg/m2


Swollen legs


Varicose veins


Pregnancy or postpartum


History of unexplained or recurrent


   spontaneous 


Oral contraceptives or hormone


   replacement


Sepsis (< 1 month)


Serious lung disease, including


   pneumonia (< 1 month)


Abnormal pulmonary function


Acute myocardial infarction


Congestive heart failure (< 1 month)


History of inflammatory bowel disease


Medical patient at bed rest Age 61-74


Arthroscopic surgery


Major open surgery (> 45 min)


Laparoscopic surgery (> 45 min)


Malignancy


Confined to bed (> 72 hours)


Immobilizing plaster cast


Central venous access Age >= 75


History of VTE


Family history of VTE


Factor V Leiden


Prothrombin 80169B


Lupus anticoagulant


Anticardiolipin antibodies


Elevated serum homocysteine


Heparin-induced thrombocytopenia


Other congenital or acquired


   thrombophilia Stroke (< 1 month)


Elective arthroplasty


Hip, pelvis, or leg fracture


Acute spinal cord injury (< 1 month)








Prophylaxis Regimen











   Total Risk


Factor Score Risk Level Prophylaxis Regimen


 


0-1      Low Early ambulation


 


2 Moderate Order ONE of the following:


*Sequential Compression Device (SCD)


*Heparin 5000 units SQ BID


 


3-4 Higher Order ONE of the following medications:


*Heparin 5000 units SQ TID


*Enoxaparin/Lovenox 40 mg SQ daily (WT < 150 kg, CrCl > 30 mL/min)


*Enoxaparin/Lovenox 30 mg SQ daily (WT < 150 kg, CrCl > 10-29 mL/min)


*Enoxaparin/Lovenox 30 mg SQ BID (WT < 150 kg, CrCl > 30 mL/min)


AND/OR


*Sequential Compression Device (SCD)


 


5 or more Highest Order ONE of the following medications:


*Heparin 5000 units SQ TID (Preferred with Epidurals)


*Enoxaparin/Lovenox 40 mg SQ daily (WT < 150 kg, CrCl > 30 mL/min)


*Enoxaparin/Lovenox 30 mg SQ daily (WT < 150 kg, CrCl > 10-29 mL/min)


*Enoxaparin/Lovenox 30 mg SQ BID (WT < 150 kg, CrCl > 30 mL/min)


AND


*Sequential Compression Device (SCD)











Assessment and Plan


Assessment and Plan


#1 Atypical chest pain-admitted to chest pain center.  Ruled out with 3 sets of 

EKGs, cardiac enzymes, and monitored overnight.  Seen and evaluated by Dr. Aleksandr Damon.  No further cardiac testing required at this time. Normal 

cardiac catheterization in . Chest discomfort most likely musculoskeletal 

in nature. Continue using home pain medications as previously prescribed and 

encouraged using heating pad to affected area. 


#2 Migraine-continue Topamax and Imitrex PRN, encouraged him to establish with 

a neurologist


#3 Subconjunctival hemorrhage-reassurance provided, explained hemorrhage will 

subside on its own, pain in left eye and left side of face same a chronic 

migraine he has had for 4 weeks. [facility] and encouraged establishing 

with a neurologist for chronic migraine headaches.


#4 GERD-continue Protonix and Carafate











Michelle Winkler Aug 21, 2017 09:31

## 2017-08-21 NOTE — EKG
Date Performed: 08/21/2017       Time Performed: 10:29:49

 

PTAGE:      51 years

 

EKG:      SINUS BRADYCARDIA LOW QRS VOLTAGE IN PRECORDIAL LEADS PROBABLE INFERIOR MYOCARDIAL INFARCTI
ON ABNORMAL ECG 

 

NO PREVIOUS TRACING            

 

DOCTOR:   Brandin Avila  Interpretating Date/Time  08/21/2017 12:02:21

## 2017-08-21 NOTE — EKG
Date Performed: 08/21/2017       Time Performed: 04:04:01

 

PTAGE:      51 years

 

EKG:      SINUS BRADYCARDIA POSSIBLE INFERIOR MYOCARDIAL INFARCTION ABNORMAL ECG

 

PREVIOUS TRACING       : 08/20/2017 19.33 No significant change from previous tracing noted.

 

DOCTOR:   Brandin Avila  Interpretating Date/Time  08/21/2017 07:48:14

## 2017-08-21 NOTE — EKG
Date Performed: 08/21/2017       Time Performed: 00:55:43

 

PTAGE:      51 years

 

EKG:      Sinus rhythm 

 

 WITH FIRST DEGREE AV BLOCK INFERIOR MYOCARDIAL INFARCTION ABNORMAL ECG 

 

NO PREVIOUS TRACING            

 

DOCTOR:   Brandin Avila  Interpretating Date/Time  08/21/2017 07:52:11

## 2017-10-16 NOTE — PD
HPI


Chief Complaint:  Pain: Acute or Chronic


Time Seen by Provider:  20:40


Travel History


International Travel<30 days:  No


Contact w/Intl Traveler<30days:  No


Traveled to known affect area:  No





History of Present Illness


HPI


50 YO M with PMH of GERD, HTN, DM, DVT, fibromyalgia, OA, anxiety presents to 

the ED for evaluation of 4/10 right elbow pain. Onset this morning when the 

patient woke up. Described as constant, worsened by abduction of the forearm.  

Patient cannot identify any acute injury.  She denies previous injury to the 

area.  He treated at home with warm compresses with no improvement of symptoms.

  [facility]





PFS


Past Medical History


Hx Anticoagulant Therapy:  No


Arthritis:  No


Asthma:  No


Anxiety:  Yes


Depression:  Yes


Heart Rhythm Problems:  No


Cancer:  No


Cardiovascular Problems:  No


High Cholesterol:  No


Chemotherapy:  No


Chest Pain:  Yes


Congestive Heart Failure:  No


COPD:  No


Cerebrovascular Accident:  No


Diabetes:  No


Diminished Hearing:  No


Deep Vein Thrombosis:  Yes


Endocrine:  No


Fibromyalgia:  Yes


Gastrointestinal Disorders:  Yes (HX OF PROBLEMS WITH RECTAL BLEEDING)


GERD:  Yes


Genitourinary:  No


Headaches:  Yes


Hiatal Hernia:  Yes


Hypertension:  Yes


Immune Disorder:  No


Implanted Vascular Access Dvce:  No


Musculoskeletal:  Yes


Neurologic:  Yes


Psychiatric:  Yes


Reproductive:  No


Respiratory:  Yes


Immunizations Current:  Yes


Migraines:  Yes


Seizures:  No


Sleep Apnea:  No


Ulcer:  Yes (NEWLY DIAGNOSED)





Past Surgical History


Abdominal Surgery:  Yes (ABDOMINAL  LAPAROSCOPIC )


Appendectomy:  Yes (2005)


Genitourinary Surgery:  Yes (VASECTOMY)


Hysterectomy:  No


Other Surgery:  Yes (HEMORRHOID SURGIES 2005, 2007, 2010, 2011, SINUS SURGERY, 

RT FOOT SX)





Social History


Alcohol Use:  Yes (occ)


Tobacco Use:  No


Substance Use:  No





Allergies-Medications


(Allergen,Severity, Reaction):  


Coded Allergies:  


     gabapentin (Unverified  Allergy, Intermediate, VOMITING, 10/16/17)


     oxycodone (Unverified  Allergy, Mild, ITCHY, 10/16/17)


Reported Meds & Prescriptions





Reported Meds & Active Scripts


Active


Reported


Buspirone (Buspirone HCl) Unknown Strength Tab 1 Tab PO TID


Topamax (Topiramate) 50 Mg Tab 50 Mg PO DAILY


Imitrex (Sumatriptan Succinate) 50 Mg Tab 50 Mg PO DAILY PRN


     If a satisfactory response has not been obtained at 2 hours, a second


     dose may be administered


Sennosides 8.6 Mg Tab 8.6 Mg PO HS


Lyrica (Pregabalin) 150 Mg Cap 150 Mg PO BID


Prazosin (Prazosin HCl) 1 Mg Cap 4 Mg PO HS


Pantoprazole (Pantoprazole Sodium) 40 Mg Tab 40 Mg PO BID


Mirtazapine 15 Mg Tab 15 Mg PO HS


Mobic (Meloxicam) 15 Mg Tab 15 Mg PO DAILY


Ativan (Lorazepam) 0.5 Mg Tab 0.5 Mg PO Q8H PRN


ZyrTEC Itchy Eye Opth Drops (Ketotifen Opth Drops) 0.025% Drops 1 Drop EACH EYE 

BID PRN


Proctofoam Topical (Rectal Foam) (Pramoxine Topical (Rectal Foam)) 1% Foam 1 

Applic TOPICAL QID PRN


Hydrocodone-Acetaminophen  mg Tab 1 Tab PO Q6H PRN


Cetirizine (Cetirizine HCl) 10 Mg Tab 10 Mg PO DAILY


Carafate Liq (Sucralfate) 1 Gm/10 Ml Susp 1 Gm PO QID


     on empty stomach


Baclofen 10 Mg Tab 10 Mg PO TID








Review of Systems


Except as stated in HPI:  all other systems reviewed are Neg





Physical Exam


Narrative


GENERAL: Well-nourished, well-developed black male in no acute distress.


SKIN: Focused skin assessment warm/dry.


HEAD: Normocephalic.


EYES: No scleral icterus. No injection or drainage. 


NECK: Supple, trachea midline. No JVD or lymphadenopathy.


CARDIOVASCULAR: Regular rate and rhythm without murmurs, gallops, or rubs. 


RESPIRATORY: Breath sounds equal bilaterally. No accessory muscle use.


GASTROINTESTINAL: Abdomen soft, non-tender, nondistended. 


MUSCULOSKELETAL: No cyanosis, or edema.


FOCUSED RIGHT UPPER EXTREMITY EXAM: 2+ radial pulse.  Tenderness to palpation 

of the medial epicondyle.  No edema, warmth or erythema.  Strong  strength.

  Pain worsened by abduction of the forearm.  Patient retains full, active BRIAN 

of the extremity.  Neurovascularly intact distally.


BACK: Nontender without obvious deformity. No CVA tenderness.





Data


Data


Last Documented VS





Vital Signs








  Date Time  Temp Pulse Resp B/P (MAP) Pulse Ox O2 Delivery O2 Flow Rate FiO2


 


10/16/17 22:07        


 


10/16/17 17:33 98.6 75 18  97 Room Air  








Orders





 Orders


Us Arm Venous Doppler (10/16/17 )


Elbow, Complete (4 Vws) (10/16/17 20:58)


Ice/Cold Pack (10/16/17 20:58)


Complete Blood Count With Diff (10/16/17 20:58)


Basic Metabolic Panel (Bmp) (10/16/17 20:58)


^ Insert Iv (10/16/17 20:58)


Baclofen (Lioresal) (10/16/17 22:00)


Ketorolac Inj (Toradol Inj) (10/16/17 22:00)


Ed Discharge Order (10/16/17 22:01)





Labs





Laboratory Tests








Test


  10/16/17


21:05


 


White Blood Count 6.9 TH/MM3 


 


Red Blood Count 5.03 MIL/MM3 


 


Hemoglobin 14.1 GM/DL 


 


Hematocrit 43.7 % 


 


Mean Corpuscular Volume 86.9 FL 


 


Mean Corpuscular Hemoglobin 28.1 PG 


 


Mean Corpuscular Hemoglobin


Concent 32.3 % 


 


 


Red Cell Distribution Width 15.3 % 


 


Platelet Count 182 TH/MM3 


 


Mean Platelet Volume 10.9 FL 


 


Neutrophils (%) (Auto) 55.4 % 


 


Lymphocytes (%) (Auto) 31.4 % 


 


Monocytes (%) (Auto) 8.7 % 


 


Eosinophils (%) (Auto) 3.2 % 


 


Basophils (%) (Auto) 1.3 % 


 


Neutrophils # (Auto) 3.8 TH/MM3 


 


Lymphocytes # (Auto) 2.2 TH/MM3 


 


Monocytes # (Auto) 0.6 TH/MM3 


 


Eosinophils # (Auto) 0.2 TH/MM3 


 


Basophils # (Auto) 0.1 TH/MM3 


 


CBC Comment DIFF FINAL 


 


Differential Comment  


 


Blood Urea Nitrogen 14 MG/DL 


 


Creatinine 0.83 MG/DL 


 


Random Glucose 89 MG/DL 


 


Calcium Level 9.2 MG/DL 


 


Sodium Level 138 MEQ/L 


 


Potassium Level 4.1 MEQ/L 


 


Chloride Level 106 MEQ/L 


 


Carbon Dioxide Level 25.0 MEQ/L 


 


Anion Gap 7 MEQ/L 


 


Estimat Glomerular Filtration


Rate 117 ML/MIN 


 











MDM


Medical Decision Making


Medical Screen Exam Complete:  Yes


Emergency Medical Condition:  Yes


Differential Diagnosis


DVT versus thrombophlebitis versus OA versus bursitis versus less likely septic 

joint versus other


Narrative Course


50 YO M with PMH of GERD, HTN, DM, DVT, fibromyalgia, OA, anxiety presents to 

the ED for evaluation of 4/10 right elbow pain. Onset this morning when the 

patient woke up. Described as constant, worsened by abduction of the forearm.  

Patient cannot identify any acute injury.  She denies previous injury to the 

area.  He treated at home with warm compresses with no improvement of symptoms.

  Patient states that [facility] who are concerned [facility] and 

referred him to the ED.  Vitals reviewed.  Physical exam reveals a nontoxic-

appearing black male in no acute distress.  There is some tenderness of the 

medial condyle of the right elbow but the physical exam is otherwise very 

reassuring.  X-ray unremarkable per radiology read.  Ultrasound of the upper 

extremity reveals no DVT or thrombophlebitis.  Basic labs without acute 

abnormalities.  Patient was administered 30 mg grams of Toradol IV and his 

evening dose of baclofen.  He is instructed to [facility] with the [facility] or the 

orthopedist for further evaluation.  The patient indicated understanding of the 

instructions.  He is agreeable to the care plan.  He is stable and discharged 

home.





Diagnosis





 Primary Impression:  


 Elbow pain, right


Referrals:  


Orthopedist





[facility]


Patient Instructions:  General Instructions, Musculoskeletal Pain (ED)





***Additional Instructions:  


Rest, ice, elevate the extremity.


Apply ice no longer than 10-15 minutes per hour a few times a day.


Return to normal, gentle activity as tolerated.


No heavy lifting or overuse of the affected extremity. 


Resume at home medications.


Follow up with orthopedist or your primary care provider.


Return to the ED for any urgent or emergent medical condition.


Disposition:  01 DISCHARGE HOME


Condition:  Stable











Allie Friedman Oct 16, 2017 20:44

## 2017-10-16 NOTE — RADRPT
EXAM DATE/TIME:  10/16/2017 21:13 

 

[facility] COMPARISON:     

No previous studies available for comparison.

 

                     

INDICATIONS :     

Posterior right elbow pain, denies injury

                     

 

MEDICAL HISTORY :     

None.          

 

SURGICAL HISTORY :     

None.   

 

ENCOUNTER:     

Initial                                        

 

ACUITY:     

1 day      

 

PAIN SCORE:     

9/10

 

LOCATION:     

Right  Elbow

 

FINDINGS:     

Multiple view examination of the right elbow demonstrates no soft tissue swelling, joint effusion, or
 fracture.  The osseous structures are in normal alignment.  Bony mineralization is normal.

 

CONCLUSION:     

Unremarkable examination of the right elbow.  

 

 

 

 

 Edilberto Galvan MD on October 16, 2017 at 21:23           

Board Certified Radiologist.

 This report was verified electronically.

## 2017-10-16 NOTE — RADRPT
EXAM DATE/TIME:  10/16/2017 21:22 

 

[facility] COMPARISON:     

No previous studies available for comparison.

        

 

 

INDICATIONS :                

Right arm pain. 

            

 

MEDICAL HISTORY :     

Hypertension. Deep venous thrombosis.  Ulcer. Dyspnea. 

 

SURGICAL HISTORY :     

Appendectomy.    Vasectomy. Bilateral rotator cuff surgery. Heart catheterization. 

 

ENCOUNTER:     

Initial

 

ACUITY:     

1 day

 

PAIN SCORE:      

5/10

 

LOCATION:      

Right  arm. 

                       

 

FINDINGS:     

There is spontaneous flow documented in the brachial, basilic, cephalic, axillary, and subclavian vei
ns.  The vessels are compressible and augmentation response is documented.  No filling defects are se
en.  The flow is phasic with respiration.  Direction of flow in the jugular vein is caudal.  

 

CONCLUSION:     

No evidence of DVT.

 

 

 

 Edilberto Galvan MD on October 16, 2017 at 21:42           

Board Certified Radiologist.

 This report was verified electronically.

## 2017-12-02 NOTE — PD
HPI


Chief Complaint:  Back/ Neck Pain or Injury


Time Seen by Provider:  12:43


Travel History


International Travel<30 days:  No


Contact w/Intl Traveler<30days:  No


Traveled to known affect area:  No





History of Present Illness


HPI


46-year-old male presents to the emergency Department with complaint of right 

lower back pain 2-1/2 months with worsening today.  Denies new or recent 

injury.  Has history of chronic low back pain for many years with 4 surgeries 

and a spinal stimulator placed in March 2016.  Pain is consistent with past 

back pain exacerbations.  Denies encopresis, incontinence, saddle anesthesias.  

Denies IV drug use, cancer.  Denies fever, vomiting, abdominal pain, difficulty 

urinating, change in stool.  Denies paresthesias, loss of sensation, decreased 

range of motion, decreased strength to bilateral lower extremities.  Takes 

hydrocodone, baclofen, pregabalin for back pain.  Has taken his medications 

with minimal relief of symptoms.  Rates pain 10/10.  Discussed as a shooting 

sensation.  Aggravated with movement.  [facility]  

History of PTSD, migraines, fibromyalgia.  Allergies oxycodone and gabapentin.  

Has no other medical complaints.  No other modifying factors or associated 

signs and symptoms.





PFSH


Past Medical History


Hx Anticoagulant Therapy:  No


Arthritis:  Yes


Asthma:  No


Anxiety:  Yes


Depression:  Yes


Heart Rhythm Problems:  No


Cancer:  No


Cardiovascular Problems:  No


High Cholesterol:  No


Chemotherapy:  No


Chest Pain:  Yes


Congestive Heart Failure:  No


COPD:  No


Cerebrovascular Accident:  No


Diabetes:  No


Diminished Hearing:  No


Deep Vein Thrombosis:  Yes


Endocrine:  No


Fibromyalgia:  Yes


Gastrointestinal Disorders:  Yes (HX OF PROBLEMS WITH RECTAL BLEEDING)


GERD:  Yes


Genitourinary:  No


Headaches:  Yes


Hiatal Hernia:  Yes


Hypertension:  Yes


Immune Disorder:  No


Inguinal Hernia:  Yes


Implanted Vascular Access Dvce:  No


Musculoskeletal:  Yes


Neurologic:  Yes


Psychiatric:  Yes


Reproductive:  No


Respiratory:  No


Immunizations Current:  Yes


Migraines:  Yes


Seizures:  No


Sleep Apnea:  No


Ulcer:  Yes (NEWLY DIAGNOSED)





Past Surgical History


Abdominal Surgery:  Yes (ABDOMINAL  LAPAROSCOPIC )


Appendectomy:  Yes (2005)


Genitourinary Surgery:  Yes (VASECTOMY)


Hysterectomy:  No


Other Surgery:  Yes (HEMORRHOID SURGIES 2005, 2007, 2010, 2011, SINUS SURGERY, 

RT FOOT SX)





Social History


Alcohol Use:  Yes (occ)


Tobacco Use:  No


Substance Use:  No





Allergies-Medications


(Allergen,Severity, Reaction):  


Coded Allergies:  


     gabapentin (Unverified  Allergy, Intermediate, VOMITING, 10/16/17)


     oxycodone (Unverified  Allergy, Mild, ITCHY, 10/16/17)


Reported Meds & Prescriptions





Reported Meds & Active Scripts


Active


Medrol Dosepak (Methylprednisolone) 4 Mg Dspk 4 Mg PO AS DIRECTED


     Per Pharmacist direction


Flexeril (Cyclobenzaprine HCl) 10 Mg Tab 10 Mg PO TID


Reported


Buspirone (Buspirone HCl) Unknown Strength Tab 1 Tab PO TID


Topamax (Topiramate) 50 Mg Tab 50 Mg PO DAILY


Imitrex (Sumatriptan Succinate) 50 Mg Tab 50 Mg PO DAILY PRN


     If a satisfactory response has not been obtained at 2 hours, a second


     dose may be administered


Sennosides 8.6 Mg Tab 8.6 Mg PO HS


Lyrica (Pregabalin) 150 Mg Cap 150 Mg PO BID


Prazosin (Prazosin HCl) 1 Mg Cap 4 Mg PO HS


Pantoprazole (Pantoprazole Sodium) 40 Mg Tab 40 Mg PO BID


Mirtazapine 15 Mg Tab 15 Mg PO HS


Mobic (Meloxicam) 15 Mg Tab 15 Mg PO DAILY


Ativan (Lorazepam) 0.5 Mg Tab 0.5 Mg PO Q8H PRN


ZyrTEC Itchy Eye Opth Drops (Ketotifen Opth Drops) 0.025% Drops 1 Drop EACH EYE 

BID PRN


Proctofoam Topical (Rectal Foam) (Pramoxine Topical (Rectal Foam)) 1% Foam 1 

Applic TOPICAL QID PRN


Hydrocodone-Acetaminophen  mg Tab 1 Tab PO Q6H PRN


Cetirizine (Cetirizine HCl) 10 Mg Tab 10 Mg PO DAILY


Carafate Liq (Sucralfate) 1 Gm/10 Ml Susp 1 Gm PO QID


     on empty stomach


Baclofen 10 Mg Tab 10 Mg PO TID








Review of Systems


Except as stated in HPI:  all other systems reviewed are Neg





Physical Exam


Narrative


GENERAL: Well-nourished, well-developed black male patient, in no acute distress

; afebrile, nontoxic-appearing


SKIN: Warm and dry.


HEAD: Atraumatic. Normocephalic. 


EYES: Pupils equal and round. No scleral icterus. No injection or drainage.


ENT: Mucosa pink and moist.  Airway patent.


NECK: Trachea midline.


CARDIOVASCULAR: Regular rate.  


RESPIRATORY: No accessory muscle use.  


GASTROINTESTINAL:  Abdomen soft, non-tender, nondistended. Positive bowel 

sounds.  No hepato-splenomegaly, or  


palpable masses. No guarding.


MUSCULOSKELETAL:  Bilateral lower extremities supple and non-tense with 2+ 

pedal pulses and sensory intact; with full range of motion and 5/5 strength.  2

+ DTRs bilaterally.   Active dorsiflexion and extension of bilateral feet.  

Right straight leg raise is positive for low back pain.  Ambulatory in room 

with guarded gait.  Sitting up in bed at 90.  No obvious deformities. No 

clubbing.  No cyanosis.  No edema. 


BACK:  No midline point tenderness on palpation of the lumbar or thoracic 

spine.  Tenderness on palpation of right lumbar iliosacral and paraspinal area.

  No obvious deformities.


NEUROLOGICAL: Awake and alert.  Oriented 3.  No obvious cranial nerve 

deficits.  Motor grossly within normal limits. Normal speech.  Moves all 

extremities.  5/5 strength to all extremities.  Sensory intact.


PSYCHIATRIC: Appropriate mood and affect; insight and judgment normal.





Data


Data


Last Documented VS





Vital Signs








  Date Time  Temp Pulse Resp B/P (MAP) Pulse Ox O2 Delivery O2 Flow Rate FiO2


 


12/2/17 11:42 98.6 87 14 140/78 (98) 97   








Orders





 Orders


Ketorolac Inj (Toradol Inj) (12/2/17 13:15)


Orphenadrine Inj (Norflex Inj) (12/2/17 13:15)


Ed Discharge Order (12/2/17 14:26)








[facility]


Medical Decision Making


Medical Screen Exam Complete:  Yes


Emergency Medical Condition:  Yes


Medical Record Reviewed:  Yes


Differential Diagnosis


Acute exacerbation of chronic low back pain, sciatica, back strain


Narrative Course


52-year-old male with acute exacerbation of chronic low back pain, right-sided.

  Denies new or recent injury.  Denies IV drug use or cancer.  Denies encopresis

, incontinence, saddle anesthesias.  No midline tenderness on palpation of the 

lumbar spine.  Patient ambulatory in the room with a guarded gait.  Toradol and 

Norflex administered in the ER.  Has hydrocodone, baclofen, pregabalin for 

chronic back pain.  Flexeril and Medrol Dosepak prescribed for home.  

Instructed patient to follow up with primary care provider.  Patient verbalizes 

understanding and agreement with treatment plan.  Patient is medically cleared 

and stable for discharge.  Discussed reasons to return to the emergency 

department.  Patient agrees with treatment plan.  The patients vital signs are 

stable and the patient is stable for outpatient follow-up and treatment.  

Patient discharged home, stable and in no acute distress.





Diagnosis





 Primary Impression:  


 Acute exacerbation of chronic low back pain


Referrals:  


Primary Care Physician


Patient Instructions:  Acute Low Back Pain (ED), General Instructions, Sciatica 

(ED)





***Additional Instructions:  


Tylenol or ibuprofen as directed and as needed for pain


Flexeril as prescribed and as needed for muscle spasms


Heating pad and/or ice to affected area to reduce pain


Avoid aggravating activities; increase activity as tolerated


Follow-up with primary care provider


Return to emergency department immediately with worsening of symptoms


***Med/Other Pt SpecificInfo:  Prescription(s) given


Scripts


Methylprednisolone Dosepak (Medrol Dosepak) 4 Mg Dspk


4 MG PO AS DIRECTED, #1 DSPK 0 Refills


   Per Pharmacist direction


   Prov: Riya Johnson         12/2/17 


Cyclobenzaprine (Flexeril) 10 Mg Tab


10 MG PO TID for Muscle Spasm, #30 TAB 0 Refills


   Prov: Riya Johnson         12/2/17


Disposition:  01 DISCHARGE HOME


Condition:  Stable











Riya Johnson Dec 2, 2017 13:04

## 2018-04-05 ENCOUNTER — HOSPITAL ENCOUNTER (EMERGENCY)
Dept: HOSPITAL 17 - NEPC | Age: 53
Discharge: HOME | End: 2018-04-05
Payer: OTHER GOVERNMENT

## 2018-04-05 VITALS
OXYGEN SATURATION: 97 % | DIASTOLIC BLOOD PRESSURE: 80 MMHG | SYSTOLIC BLOOD PRESSURE: 127 MMHG | RESPIRATION RATE: 18 BRPM | HEART RATE: 61 BPM

## 2018-04-05 VITALS
SYSTOLIC BLOOD PRESSURE: 134 MMHG | RESPIRATION RATE: 16 BRPM | TEMPERATURE: 98.5 F | DIASTOLIC BLOOD PRESSURE: 74 MMHG | OXYGEN SATURATION: 97 % | HEART RATE: 82 BPM

## 2018-04-05 DIAGNOSIS — Z88.5: ICD-10-CM

## 2018-04-05 DIAGNOSIS — K62.5: Primary | ICD-10-CM

## 2018-04-05 DIAGNOSIS — F32.9: ICD-10-CM

## 2018-04-05 DIAGNOSIS — K21.9: ICD-10-CM

## 2018-04-05 DIAGNOSIS — Z86.718: ICD-10-CM

## 2018-04-05 DIAGNOSIS — F41.9: ICD-10-CM

## 2018-04-05 DIAGNOSIS — M79.7: ICD-10-CM

## 2018-04-05 DIAGNOSIS — Z79.899: ICD-10-CM

## 2018-04-05 DIAGNOSIS — I10: ICD-10-CM

## 2018-04-05 LAB
ALBUMIN SERPL-MCNC: 3.9 GM/DL (ref 3.4–5)
ALP SERPL-CCNC: 57 U/L (ref 45–117)
ALT SERPL-CCNC: 22 U/L (ref 12–78)
AST SERPL-CCNC: 25 U/L (ref 15–37)
BASOPHILS # BLD AUTO: 0 TH/MM3 (ref 0–0.2)
BASOPHILS NFR BLD: 0.8 % (ref 0–2)
BILIRUB INDIRECT SERPL-MCNC: 0.5 MG/DL (ref 0–0.8)
BILIRUB SERPL-MCNC: 0.6 MG/DL (ref 0.2–1)
BUN SERPL-MCNC: 10 MG/DL (ref 7–18)
CALCIUM SERPL-MCNC: 8.7 MG/DL (ref 8.5–10.1)
CHLORIDE SERPL-SCNC: 106 MEQ/L (ref 98–107)
COLOR UR: (no result)
CREAT SERPL-MCNC: 1.02 MG/DL (ref 0.6–1.3)
DIRECT BILIRUBIN ADULT: 0.1 MG/DL (ref 0–0.2)
EOSINOPHIL # BLD: 0.1 TH/MM3 (ref 0–0.4)
EOSINOPHIL NFR BLD: 3.3 % (ref 0–4)
ERYTHROCYTE [DISTWIDTH] IN BLOOD BY AUTOMATED COUNT: 15.4 % (ref 11.6–17.2)
GFR SERPLBLD BASED ON 1.73 SQ M-ARVRAT: 92 ML/MIN (ref 89–?)
GLUCOSE SERPL-MCNC: 85 MG/DL (ref 74–106)
GLUCOSE UR STRIP-MCNC: (no result) MG/DL
HCO3 BLD-SCNC: 28.5 MEQ/L (ref 21–32)
HCT VFR BLD CALC: 42.4 % (ref 39–51)
HGB BLD-MCNC: 14.3 GM/DL (ref 13–17)
HGB UR QL STRIP: (no result)
INR PPP: 1.2 RATIO
KETONES UR STRIP-MCNC: (no result) MG/DL
LYMPHOCYTES # BLD AUTO: 1.6 TH/MM3 (ref 1–4.8)
LYMPHOCYTES NFR BLD AUTO: 36.6 % (ref 9–44)
MCH RBC QN AUTO: 29 PG (ref 27–34)
MCHC RBC AUTO-ENTMCNC: 33.7 % (ref 32–36)
MCV RBC AUTO: 86.2 FL (ref 80–100)
MONOCYTE #: 0.5 TH/MM3 (ref 0–0.9)
MONOCYTES NFR BLD: 11.1 % (ref 0–8)
MUCOUS THREADS #/AREA URNS LPF: (no result) /LPF
NEUTROPHILS # BLD AUTO: 2.2 TH/MM3 (ref 1.8–7.7)
NEUTROPHILS NFR BLD AUTO: 48.2 % (ref 16–70)
NITRITE UR QL STRIP: (no result)
PLATELET # BLD: 166 TH/MM3 (ref 150–450)
PMV BLD AUTO: 11.4 FL (ref 7–11)
PROT SERPL-MCNC: 8 GM/DL (ref 6.4–8.2)
PROTHROMBIN TIME: 11.8 SEC (ref 9.8–11.6)
RBC # BLD AUTO: 4.92 MIL/MM3 (ref 4.5–5.9)
SODIUM SERPL-SCNC: 139 MEQ/L (ref 136–145)
SP GR UR STRIP: 1.02 (ref 1–1.03)
SQUAMOUS #/AREA URNS HPF: <1 /HPF (ref 0–5)
URINE LEUKOCYTE ESTERASE: (no result)
WBC # BLD AUTO: 4.5 TH/MM3 (ref 4–11)

## 2018-04-05 PROCEDURE — 85610 PROTHROMBIN TIME: CPT

## 2018-04-05 PROCEDURE — 80048 BASIC METABOLIC PNL TOTAL CA: CPT

## 2018-04-05 PROCEDURE — 99285 EMERGENCY DEPT VISIT HI MDM: CPT

## 2018-04-05 PROCEDURE — 81001 URINALYSIS AUTO W/SCOPE: CPT

## 2018-04-05 PROCEDURE — 93005 ELECTROCARDIOGRAM TRACING: CPT

## 2018-04-05 PROCEDURE — 85025 COMPLETE CBC W/AUTO DIFF WBC: CPT

## 2018-04-05 PROCEDURE — 83690 ASSAY OF LIPASE: CPT

## 2018-04-05 PROCEDURE — 85730 THROMBOPLASTIN TIME PARTIAL: CPT

## 2018-04-05 PROCEDURE — 74177 CT ABD & PELVIS W/CONTRAST: CPT

## 2018-04-05 PROCEDURE — 80076 HEPATIC FUNCTION PANEL: CPT

## 2018-04-05 NOTE — EKG
Date Performed: 04/05/2018       Time Performed: 12:11:54

 

PTAGE:      52 years

 

EKG:      Sinus rhythm 

 

 NORMAL ECG

 

PREVIOUS TRACING       : 08/21/2017 10.29 No significant change from previous tracing noted.

 

DOCTOR:   Brandin Avila  Interpretating Date/Time  04/05/2018 20:27:15

## 2018-04-05 NOTE — PD
HPI


Chief Complaint:  GI Complaint


Time Seen by Provider:  15:55


Travel History


International Travel<30 days:  No


Contact w/Intl Traveler<30days:  No


Traveled to known affect area:  No





History of Present Illness


HPI


This is a 52-year-old male who presents for evaluation of abdominal pain.  

Symptoms started 5 days ago.  He describes it as a crampy pain in his left 

lower quadrant which is constant.  He reports that he has had bright red blood 

per rectum when he wipes for the past 5 days as well.  Denies fevers, chills, 

testicular scrotal pain, nausea or vomiting, constipation.  He has had some 

loose stools.  No history of diverticulosis.  He does have a history of 

hemorrhoids.  He reports that he had a normal colonoscopy in 2014.  He has no 

other complaints.





PFSH


Past Medical History


Hx Anticoagulant Therapy:  No


Arthritis:  Yes


Asthma:  No


Anxiety:  Yes


Depression:  Yes


Heart Rhythm Problems:  No


Cancer:  No


Cardiovascular Problems:  No


High Cholesterol:  No


Chemotherapy:  No


Chest Pain:  Yes


Congestive Heart Failure:  No


COPD:  No


Cerebrovascular Accident:  No


Diabetes:  No


Diminished Hearing:  No


Deep Vein Thrombosis:  Yes


Endocrine:  No


Fibromyalgia:  Yes


Gastrointestinal Disorders:  Yes (HX OF PROBLEMS WITH RECTAL BLEEDING)


GERD:  Yes


Genitourinary:  No


Headaches:  Yes


Hiatal Hernia:  Yes


Hypertension:  Yes


Immune Disorder:  No


Inguinal Hernia:  Yes


Implanted Vascular Access Dvce:  No


Musculoskeletal:  Yes


Neurologic:  Yes


Psychiatric:  Yes


Reproductive:  No


Respiratory:  No


Immunizations Current:  Yes


Migraines:  Yes


Seizures:  No


Sleep Apnea:  No


Ulcer:  Yes (NEWLY DIAGNOSED)


Tetanus Vaccination:  > 5 Years


Influenza Vaccination:  No





Past Surgical History


Abdominal Surgery:  Yes (ABDOMINAL  LAPAROSCOPIC )


Appendectomy:  Yes (2005)


Genitourinary Surgery:  Yes (VASECTOMY)


Hysterectomy:  No


Other Surgery:  Yes (HEMORRHOID SURGIES 2005, 2007, 2010, 2011, SINUS SURGERY, 

RT FOOT SX)





Family History


Family Myocardial Infarction:  Yes





Social History


Alcohol Use:  Yes (occ)


Tobacco Use:  No


Substance Use:  No





Allergies-Medications


(Allergen,Severity, Reaction):  


Coded Allergies:  


     gabapentin (Unverified  Allergy, Intermediate, VOMITING, 10/16/17)


     oxycodone (Unverified  Allergy, Mild, ITCHY, 10/16/17)


Reported Meds & Prescriptions





Reported Meds & Active Scripts


Active


Reported


Effexor (Venlafaxine HCl) 75 Mg Tab 225 Mg PO DAILY


Miralax Powder (Polyethylene Glycol 3350 Powder) 17 Gm Powd 17 Gm PO DAILY


     Mix and dissolve one measuring cap-ful (17 grams) in water or juice.


Oxybutynin ER 24 HR (Oxybutynin Chloride) 15 Mg Tab 15 Mg PO DAILY


Metoclopramide (Metoclopramide HCl) 10 Mg Tab 10 Mg PO QID PRN


Flonase Nasal Spray (Fluticasone Nasal Spray) 50 Mcg/Act Spray 2 [place] EACH 

NARE DAILY


Colace (Docusate Sodium) 100 Mg Capsule 100 Mg PO BID PRN


Vitamin D-1000 (Cholecalciferol) 1,000 Unit Tab 2,000 Units PO BID


Refresh Plus Unit-Dose 0.5% Opth (Carboxymethylcellulose Sodium 0.5% Opth) 0.5% 

Drops 1 Drop EACH EYE QID PRN


Amlodipine (Amlodipine Besylate) 2.5 Mg Tab 2.5 Mg PO DAILY


Alfuzosin HCl ER (Alfuzosin HCl) 10 Mg Tab 10 Mg PO DAILY


Ventolin Hfa 18 GM Inh (Albuterol Sulfate) 90 Mcg/Act Aer 2 Puff INH QID PRN


Prazosin (Prazosin HCl) 5 Mg Cap 5 Mg PO HS


Topamax (Topiramate) 50 Mg Tab 50 Mg PO HS


Imitrex (Sumatriptan Succinate) 50 Mg Tab 50 Mg PO DAILY PRN


     If a satisfactory response has not been obtained at 2 hours, a second


     dose may be administered


Sennosides 8.6 Mg Tab 8.6 Mg PO HS


Lyrica (Pregabalin) 150 Mg Cap 150 Mg PO BID


Pantoprazole (Pantoprazole Sodium) 40 Mg Tab 40 Mg PO BID


Mobic (Meloxicam) 15 Mg Tab 15 Mg PO DAILY


ZyrTEC Itchy Eye Opth Drops (Ketotifen Opth Drops) 0.025% Drops 1 Drop EACH EYE 

TID


Proctofoam Topical (Rectal Foam) (Pramoxine Topical (Rectal Foam)) 1% Foam 1 

Applic TOPICAL QID PRN


Cetirizine (Cetirizine HCl) 10 Mg Tab 10 Mg PO DAILY


Carafate Liq (Sucralfate) 1 Gm/10 Ml Susp 1 Gm PO QID


     on empty stomach


Baclofen 10 Mg Tab 15 Mg PO TID PRN








Review of Systems


Except as stated in HPI:  all other systems reviewed are Neg





Physical Exam


Narrative


GENERAL: Well-developed well-nourished male in no acute distress


SKIN: Warm and dry.


HEAD: Atraumatic. Normocephalic. 


EYES: Pupils equal and round. No scleral icterus. No injection or drainage. 


ENT: No nasal bleeding or discharge.  Mucous membranes pink and moist.


NECK: Trachea midline. No JVD. 


CARDIOVASCULAR: Regular rate and rhythm.  No murmur appreciated.


RESPIRATORY: No accessory muscle use. Clear to auscultation. Breath sounds 

equal bilaterally. 


GASTROINTESTINAL: Abdomen soft, mild left lower quadrant tenderness without 

guarding.  Rectal examination reveals no evidence of external hemorrhoid.  

Yellow stool is noted which is faintly Hemoccult positive.


MUSCULOSKELETAL: No obvious deformities. No clubbing.  No cyanosis.  No edema. 


NEUROLOGICAL: Awake and alert. No obvious cranial nerve deficits.  Motor 

grossly within normal limits. Normal speech.


PSYCHIATRIC: Appropriate mood and affect; insight and judgment normal.





Data


Data


Last Documented VS





Vital Signs








  Date Time  Temp Pulse Resp B/P (MAP) Pulse Ox O2 Delivery O2 Flow Rate FiO2


 


4/5/18 16:43  61 18 127/80 (96) 97   


 


4/5/18 11:47 98.5       








Orders





 Orders


Complete Blood Count With Diff (4/5/18 11:49)


Basic Metabolic Panel (Bmp) (4/5/18 11:49)


Act Partial Throm Time (Ptt) (4/5/18 11:49)


Prothrombin Time / Inr (Pt) (4/5/18 11:49)


Electrocardiogram (4/5/18 )


Lipase (4/5/18 15:29)


Hepatic Functional Panel (4/5/18 16:03)


Urinalysis - C+S If Indicated (4/5/18 16:03)


Lipase (4/5/18 16:03)


Ct Abd/Pel W Iv Contrast(Rout) (4/5/18 16:04)


Iohexol 350 Inj (Omnipaque 350 Inj) (4/5/18 11:23)


Ed Discharge Order (4/5/18 19:51)





Labs





Laboratory Tests








Test


  4/5/18


12:10 4/5/18


18:00


 


White Blood Count 4.5 TH/MM3  


 


Red Blood Count 4.92 MIL/MM3  


 


Hemoglobin 14.3 GM/DL  


 


Hematocrit 42.4 %  


 


Mean Corpuscular Volume 86.2 FL  


 


Mean Corpuscular Hemoglobin 29.0 PG  


 


Mean Corpuscular Hemoglobin


Concent 33.7 % 


  


 


 


Red Cell Distribution Width 15.4 %  


 


Platelet Count 166 TH/MM3  


 


Mean Platelet Volume 11.4 FL  


 


Neutrophils (%) (Auto) 48.2 %  


 


Lymphocytes (%) (Auto) 36.6 %  


 


Monocytes (%) (Auto) 11.1 %  


 


Eosinophils (%) (Auto) 3.3 %  


 


Basophils (%) (Auto) 0.8 %  


 


Neutrophils # (Auto) 2.2 TH/MM3  


 


Lymphocytes # (Auto) 1.6 TH/MM3  


 


Monocytes # (Auto) 0.5 TH/MM3  


 


Eosinophils # (Auto) 0.1 TH/MM3  


 


Basophils # (Auto) 0.0 TH/MM3  


 


CBC Comment DIFF FINAL  


 


Differential Comment   


 


Prothrombin Time 11.8 SEC  


 


Prothromb Time International


Ratio 1.2 RATIO 


  


 


 


Activated Partial


Thromboplast Time 24.2 SEC 


  


 


 


Blood Urea Nitrogen 10 MG/DL  


 


Creatinine 1.02 MG/DL  


 


Random Glucose 85 MG/DL  


 


Calcium Level 8.7 MG/DL  


 


Sodium Level 139 MEQ/L  


 


Potassium Level 3.9 MEQ/L  


 


Chloride Level 106 MEQ/L  


 


Carbon Dioxide Level 28.5 MEQ/L  


 


Anion Gap 5 MEQ/L  


 


Estimat Glomerular Filtration


Rate 92 ML/MIN 


  


 


 


Total Bilirubin 0.6 MG/DL  


 


Direct Bilirubin 0.1 MG/DL  


 


Indirect Bilirubin 0.5 MG/DL  


 


Aspartate Amino Transf


(AST/SGOT) 25 U/L 


  


 


 


Alanine Aminotransferase


(ALT/SGPT) 22 U/L 


  


 


 


Alkaline Phosphatase 57 U/L  


 


Total Protein 8.0 GM/DL  


 


Albumin 3.9 GM/DL  


 


Lipase 174 U/L  


 


Urine Color  LIGHT-YELLOW 


 


Urine Turbidity  CLEAR 


 


Urine pH  5.5 


 


Urine Specific Gravity  1.016 


 


Urine Protein  NEG mg/dL 


 


Urine Glucose (UA)  NEG mg/dL 


 


Urine Ketones  NEG mg/dL 


 


Urine Occult Blood  NEG 


 


Urine Nitrite  NEG 


 


Urine Bilirubin  NEG 


 


Urine Urobilinogen


  


  LESS THAN 2.0


MG/DL


 


Urine Leukocyte Esterase  NEG 


 


Urine WBC


  


  LESS THAN 1


/hpf


 


Urine Squamous Epithelial


Cells 


  <1 /hpf 


 


 


Urine Mucus  FEW /lpf 


 


Microscopic Urinalysis Comment


  


  CULT NOT


INDICATED











MDM


Medical Decision Making


Medical Screen Exam Complete:  Yes


Emergency Medical Condition:  Yes


Medical Record Reviewed:  Yes


Differential Diagnosis


Diverticulitis, colitis, upper GI bleed, AV malformation, polyp, malignancy, 

hemorrhoid


Narrative Course


Lab work, CT abdomen and pelvis have been ordered.





CT abdomen and pelvis reveals


CONCLUSION:     


1. No acute findings.


His lab work is unremarkable, he is hemoglobin level is normal.  At this point 

time the plan is to discharge him with outpatient follow-up with primary care 

physician and gastroenterologist.  Discussed signs and symptoms that would 

warrant returning to the emergency room.  He is stable for discharge.





[facility] Point of Care


Internal Pos. & Neg. Controls:  Passed


Fecal Specimen Occult Blood:  Positive





Diagnosis





 Primary Impression:  


 Rectal bleeding





***Additional Instructions:  


Follow-up with primary care physician and outpatient gastroenterologist.  Avoid 

straining when having bowel movements.  Increase fiber intake, fluid intake.  

If you develop severe abdominal pain, hemorrhagic rectal bleeding, return to 

the emergency room.


***Med/Other Pt SpecificInfo:  No Change to Meds


Disposition:  01 DISCHARGE HOME


Condition:  Stable











Sulaiman Noel Apr 5, 2018 16:53

## 2018-04-05 NOTE — RADRPT
EXAM DATE/TIME:  04/05/2018 19:05 

 

[facility] COMPARISON:     

No previous studies available for comparison.

 

 

INDICATIONS :     

Left abdomen pain and rectal bleeding.

                      

 

IV CONTRAST:     

96 cc Omnipaque 350 (iohexol) IV 

 

 

ORAL CONTRAST:      

No oral contrast ingested.

                      

 

RADIATION DOSE:     

7.44 CTDIvol (mGy) 

 

 

MEDICAL HISTORY :     

Hypertension. Ulcers. Hernia, hiatal.Deep veinous, thrombosis

 

SURGICAL HISTORY :      

Appendectomy. Pain stimulator

 

ENCOUNTER:      

Initial

 

ACUITY:      

4 - 6 days

 

PAIN SCALE:      

8/10

 

LOCATION:         

Abdomen

 

TECHNIQUE:     

Volumetric scanning of the abdomen and pelvis was performed.  Using automated exposure control and ad
justment of the mA and/or kV according to patient size, radiation dose was kept as low as reasonably 
achievable to obtain optimal diagnostic quality images.  DICOM format image data is available electro
nically for review and comparison.  

 

FINDINGS:     

Lung bases are clear. No acute findings in the liver, spleen, adrenals, kidneys or pancreas. There ca
lcified gallstones or biliary ductal dilatation.

 

No pelvic masses or free fluid. No acute bony abnormalities. Simulator wire in spinal canal.

 

CONCLUSION:     

1. No acute findings.

 

 

 

 Garett Bryant MD on April 05, 2018 at 19:39           

Board Certified Radiologist.

 This report was verified electronically.

## 2018-04-11 ENCOUNTER — HOSPITAL ENCOUNTER (OUTPATIENT)
Dept: HOSPITAL 17 - NEPE | Age: 53
Setting detail: OBSERVATION
LOS: 1 days | Discharge: HOME | End: 2018-04-12
Attending: HOSPITALIST | Admitting: HOSPITALIST
Payer: OTHER GOVERNMENT

## 2018-04-11 VITALS
SYSTOLIC BLOOD PRESSURE: 131 MMHG | TEMPERATURE: 97.5 F | OXYGEN SATURATION: 98 % | RESPIRATION RATE: 18 BRPM | HEART RATE: 56 BPM | DIASTOLIC BLOOD PRESSURE: 86 MMHG

## 2018-04-11 VITALS
RESPIRATION RATE: 18 BRPM | TEMPERATURE: 98 F | SYSTOLIC BLOOD PRESSURE: 108 MMHG | OXYGEN SATURATION: 97 % | DIASTOLIC BLOOD PRESSURE: 74 MMHG | HEART RATE: 59 BPM

## 2018-04-11 VITALS
OXYGEN SATURATION: 96 % | HEART RATE: 57 BPM | DIASTOLIC BLOOD PRESSURE: 74 MMHG | SYSTOLIC BLOOD PRESSURE: 125 MMHG | RESPIRATION RATE: 18 BRPM | TEMPERATURE: 97.5 F

## 2018-04-11 VITALS
DIASTOLIC BLOOD PRESSURE: 83 MMHG | OXYGEN SATURATION: 98 % | HEART RATE: 64 BPM | RESPIRATION RATE: 17 BRPM | SYSTOLIC BLOOD PRESSURE: 142 MMHG | TEMPERATURE: 98.3 F

## 2018-04-11 VITALS — BODY MASS INDEX: 33.22 KG/M2 | WEIGHT: 211.64 LBS | HEIGHT: 67 IN

## 2018-04-11 VITALS
TEMPERATURE: 97.8 F | RESPIRATION RATE: 18 BRPM | OXYGEN SATURATION: 97 % | DIASTOLIC BLOOD PRESSURE: 80 MMHG | HEART RATE: 57 BPM | SYSTOLIC BLOOD PRESSURE: 131 MMHG

## 2018-04-11 VITALS — HEART RATE: 60 BPM

## 2018-04-11 DIAGNOSIS — M19.90: ICD-10-CM

## 2018-04-11 DIAGNOSIS — E11.9: ICD-10-CM

## 2018-04-11 DIAGNOSIS — R51: ICD-10-CM

## 2018-04-11 DIAGNOSIS — G89.29: ICD-10-CM

## 2018-04-11 DIAGNOSIS — R53.1: ICD-10-CM

## 2018-04-11 DIAGNOSIS — K21.9: ICD-10-CM

## 2018-04-11 DIAGNOSIS — F32.9: ICD-10-CM

## 2018-04-11 DIAGNOSIS — R26.81: ICD-10-CM

## 2018-04-11 DIAGNOSIS — M25.511: ICD-10-CM

## 2018-04-11 DIAGNOSIS — R55: Primary | ICD-10-CM

## 2018-04-11 DIAGNOSIS — I10: ICD-10-CM

## 2018-04-11 DIAGNOSIS — F43.10: ICD-10-CM

## 2018-04-11 DIAGNOSIS — F41.9: ICD-10-CM

## 2018-04-11 DIAGNOSIS — R00.1: ICD-10-CM

## 2018-04-11 DIAGNOSIS — R94.01: ICD-10-CM
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
## 2018-04-11 DIAGNOSIS — M79.7: ICD-10-CM

## 2018-04-11 DIAGNOSIS — Z79.899: ICD-10-CM

## 2018-04-11 LAB
ALBUMIN SERPL-MCNC: 3.9 GM/DL (ref 3.4–5)
ALP SERPL-CCNC: 59 U/L (ref 45–117)
ALT SERPL-CCNC: 21 U/L (ref 12–78)
AST SERPL-CCNC: 17 U/L (ref 15–37)
BASOPHILS # BLD AUTO: 0.1 TH/MM3 (ref 0–0.2)
BASOPHILS NFR BLD: 0.9 % (ref 0–2)
BILIRUB SERPL-MCNC: 0.3 MG/DL (ref 0.2–1)
BUN SERPL-MCNC: 13 MG/DL (ref 7–18)
CALCIUM SERPL-MCNC: 8.9 MG/DL (ref 8.5–10.1)
CHLORIDE SERPL-SCNC: 107 MEQ/L (ref 98–107)
CHOLEST SERPL-MCNC: 113 MG/DL (ref 120–200)
CHOLESTEROL/ HDL RATIO: 2.31 RATIO
COLOR UR: YELLOW
CREAT SERPL-MCNC: 1.1 MG/DL (ref 0.6–1.3)
EOSINOPHIL # BLD: 0.2 TH/MM3 (ref 0–0.4)
EOSINOPHIL NFR BLD: 2.7 % (ref 0–4)
ERYTHROCYTE [DISTWIDTH] IN BLOOD BY AUTOMATED COUNT: 15.2 % (ref 11.6–17.2)
GFR SERPLBLD BASED ON 1.73 SQ M-ARVRAT: 85 ML/MIN (ref 89–?)
GLUCOSE SERPL-MCNC: 91 MG/DL (ref 74–106)
GLUCOSE UR STRIP-MCNC: (no result) MG/DL
HCO3 BLD-SCNC: 28.6 MEQ/L (ref 21–32)
HCT VFR BLD CALC: 42.7 % (ref 39–51)
HDLC SERPL-MCNC: 48.8 MG/DL (ref 40–60)
HGB BLD-MCNC: 14 GM/DL (ref 13–17)
HGB UR QL STRIP: (no result)
KETONES UR STRIP-MCNC: (no result) MG/DL
LDLC SERPL-MCNC: 47 MG/DL (ref 0–99)
LYMPHOCYTES # BLD AUTO: 1.9 TH/MM3 (ref 1–4.8)
LYMPHOCYTES NFR BLD AUTO: 31.3 % (ref 9–44)
MAGNESIUM SERPL-MCNC: 2.2 MG/DL (ref 1.5–2.5)
MCH RBC QN AUTO: 28.1 PG (ref 27–34)
MCHC RBC AUTO-ENTMCNC: 32.9 % (ref 32–36)
MCV RBC AUTO: 85.5 FL (ref 80–100)
MONOCYTE #: 0.6 TH/MM3 (ref 0–0.9)
MONOCYTES NFR BLD: 9.5 % (ref 0–8)
MUCOUS THREADS #/AREA URNS LPF: (no result) /LPF
NEUTROPHILS # BLD AUTO: 3.3 TH/MM3 (ref 1.8–7.7)
NEUTROPHILS NFR BLD AUTO: 55.6 % (ref 16–70)
NITRITE UR QL STRIP: (no result)
PLATELET # BLD: 170 TH/MM3 (ref 150–450)
PMV BLD AUTO: 10.8 FL (ref 7–11)
PROT SERPL-MCNC: 7.7 GM/DL (ref 6.4–8.2)
RBC # BLD AUTO: 4.99 MIL/MM3 (ref 4.5–5.9)
SODIUM SERPL-SCNC: 139 MEQ/L (ref 136–145)
SP GR UR STRIP: 1.01 (ref 1–1.03)
TRIGL SERPL-MCNC: 85 MG/DL (ref 42–150)
TROPONIN I SERPL-MCNC: (no result) NG/ML (ref 0.02–0.05)
URINE LEUKOCYTE ESTERASE: (no result)
WBC # BLD AUTO: 6 TH/MM3 (ref 4–11)

## 2018-04-11 PROCEDURE — 93880 EXTRACRANIAL BILAT STUDY: CPT

## 2018-04-11 PROCEDURE — 97161 PT EVAL LOW COMPLEX 20 MIN: CPT

## 2018-04-11 PROCEDURE — 82552 ASSAY OF CPK IN BLOOD: CPT

## 2018-04-11 PROCEDURE — 73030 X-RAY EXAM OF SHOULDER: CPT

## 2018-04-11 PROCEDURE — 96375 TX/PRO/DX INJ NEW DRUG ADDON: CPT

## 2018-04-11 PROCEDURE — 96374 THER/PROPH/DIAG INJ IV PUSH: CPT

## 2018-04-11 PROCEDURE — 82948 REAGENT STRIP/BLOOD GLUCOSE: CPT

## 2018-04-11 PROCEDURE — 96361 HYDRATE IV INFUSION ADD-ON: CPT

## 2018-04-11 PROCEDURE — 93306 TTE W/DOPPLER COMPLETE: CPT

## 2018-04-11 PROCEDURE — 93005 ELECTROCARDIOGRAM TRACING: CPT

## 2018-04-11 PROCEDURE — 85025 COMPLETE CBC W/AUTO DIFF WBC: CPT

## 2018-04-11 PROCEDURE — 80053 COMPREHEN METABOLIC PANEL: CPT

## 2018-04-11 PROCEDURE — 82550 ASSAY OF CK (CPK): CPT

## 2018-04-11 PROCEDURE — 83036 HEMOGLOBIN GLYCOSYLATED A1C: CPT

## 2018-04-11 PROCEDURE — 83605 ASSAY OF LACTIC ACID: CPT

## 2018-04-11 PROCEDURE — 73200 CT UPPER EXTREMITY W/O DYE: CPT

## 2018-04-11 PROCEDURE — 81001 URINALYSIS AUTO W/SCOPE: CPT

## 2018-04-11 PROCEDURE — 71045 X-RAY EXAM CHEST 1 VIEW: CPT

## 2018-04-11 PROCEDURE — 84484 ASSAY OF TROPONIN QUANT: CPT

## 2018-04-11 PROCEDURE — 80061 LIPID PANEL: CPT

## 2018-04-11 PROCEDURE — 70450 CT HEAD/BRAIN W/O DYE: CPT

## 2018-04-11 PROCEDURE — 99285 EMERGENCY DEPT VISIT HI MDM: CPT

## 2018-04-11 PROCEDURE — 95819 EEG AWAKE AND ASLEEP: CPT

## 2018-04-11 PROCEDURE — G0378 HOSPITAL OBSERVATION PER HR: HCPCS

## 2018-04-11 PROCEDURE — 83735 ASSAY OF MAGNESIUM: CPT

## 2018-04-11 RX ADMIN — VITAMIN D, TAB 1000IU (100/BT) SCH UNITS: 25 TAB at 21:00

## 2018-04-11 RX ADMIN — SUCRALFATE SCH GM: 1 SUSPENSION ORAL at 17:20

## 2018-04-11 RX ADMIN — PHENYTOIN SODIUM SCH MLS/HR: 50 INJECTION INTRAMUSCULAR; INTRAVENOUS at 11:03

## 2018-04-11 RX ADMIN — OLOPATADINE HYDROCHLORIDE SCH DROP: 1 SOLUTION/ DROPS OPHTHALMIC at 21:00

## 2018-04-11 RX ADMIN — SUCRALFATE SCH GM: 1 SUSPENSION ORAL at 14:34

## 2018-04-11 RX ADMIN — PREGABALIN SCH MG: 75 CAPSULE ORAL at 21:00

## 2018-04-11 RX ADMIN — PANTOPRAZOLE SCH MG: 40 TABLET, DELAYED RELEASE ORAL at 21:00

## 2018-04-11 RX ADMIN — Medication SCH ML: at 21:00

## 2018-04-11 RX ADMIN — SUCRALFATE SCH GM: 1 SUSPENSION ORAL at 21:00

## 2018-04-11 RX ADMIN — ENOXAPARIN SODIUM SCH MG: 40 INJECTION SUBCUTANEOUS at 13:00

## 2018-04-11 NOTE — PD
HPI


Chief Complaint:  Syncope/Near-Syncope


Time Seen by Provider:  08:40


Travel History


International Travel<30 days:  No


Contact w/Intl Traveler<30days:  No


Traveled to known affect area:  No





History of Present Illness


HPI


The patient is a 52-year-old -American male who presents to the 

emergency department after a possible syncopal episode or seizure at home.  

According to the wife the patient was changing out the cat litter box when she 

found him on the ground.  The wife states she found the patient laying on the 

right side, right shoulder right head down toward the ground, on a tile floor.  

The wife states the patient was somewhat confused for approximately 10-15 

minutes and then cleared.  The patient does complain of right-sided headache 

and right clavicle and right shoulder pain.  He denies any symptoms prior to 

the event, including palpitations, shortness of breath, lightheadedness, nausea

, vomiting, or abdominal pain.  The patient denies any history of syncope, but 

does have a history of near syncope which he attributes to PTSD.  He denies any 

known history of seizures disorders.  He denies any acute weakness or numbness 

of the upper or lower extremities.  Symptoms are moderate.  [facility]  The wife does state that the 

patient's eyes were rolled up and twitching toward the left when she found him 

lying on the ground.





PFSH


Past Medical History


Hx Anticoagulant Therapy:  No


Arthritis:  Yes


Asthma:  No


Anxiety:  Yes


Depression:  Yes


Heart Rhythm Problems:  No


Cancer:  No


Cardiovascular Problems:  No


High Cholesterol:  No


Chemotherapy:  No


Chest Pain:  Yes


Congestive Heart Failure:  No


COPD:  No


Cerebrovascular Accident:  No


Diabetes:  No


Diminished Hearing:  No


Deep Vein Thrombosis:  Yes


Endocrine:  No


Fibromyalgia:  Yes


Gastrointestinal Disorders:  Yes (HX OF PROBLEMS WITH RECTAL BLEEDING)


GERD:  Yes


Genitourinary:  No


Headaches:  Yes


Hiatal Hernia:  Yes


Hypertension:  Yes


Immune Disorder:  No


Inguinal Hernia:  Yes


Implanted Vascular Access Dvce:  No


Musculoskeletal:  Yes


Neurologic:  Yes


Psychiatric:  Yes


Reproductive:  No


Respiratory:  No


Immunizations Current:  Yes


Migraines:  Yes


Seizures:  No


Sleep Apnea:  No


Ulcer:  Yes (NEWLY DIAGNOSED)





Past Surgical History


Abdominal Surgery:  Yes (ABDOMINAL  LAPAROSCOPIC )


Appendectomy:  Yes (2005)


Genitourinary Surgery:  Yes (VASECTOMY)


Hysterectomy:  No


Other Surgery:  Yes (HEMORRHOID SURGIES 2005, 2007, 2010, 2011, SINUS SURGERY, 

RT FOOT SX)





Family History


Family Myocardial Infarction:  Yes





Social History


Alcohol Use:  Yes (occ)


Tobacco Use:  No


Substance Use:  No





Allergies-Medications


(Allergen,Severity, Reaction):  


Coded Allergies:  


     gabapentin (Unverified  Allergy, Intermediate, VOMITING, 4/11/18)


     oxycodone (Unverified  Allergy, Mild, ITCHY, 4/11/18)


Reported Meds & Prescriptions





Reported Meds & Active Scripts


Active


Reported


Effexor (Venlafaxine HCl) 75 Mg Tab 225 Mg PO DAILY


Miralax Powder (Polyethylene Glycol 3350 Powder) 17 Gm Powd 17 Gm PO DAILY


     Mix and dissolve one measuring cap-ful (17 grams) in water or juice.


Oxybutynin ER 24 HR (Oxybutynin Chloride) 15 Mg Tab 15 Mg PO DAILY


Metoclopramide (Metoclopramide HCl) 10 Mg Tab 10 Mg PO QID PRN


Flonase Nasal Spray (Fluticasone Nasal Spray) 50 Mcg/Act Spray 2 [place] EACH 

NARE DAILY


Colace (Docusate Sodium) 100 Mg Capsule 100 Mg PO BID PRN


Vitamin D-1000 (Cholecalciferol) 1,000 Unit Tab 2,000 Units PO BID


Refresh Plus Unit-Dose 0.5% Opth (Carboxymethylcellulose Sodium 0.5% Opth) 0.5% 

Drops 1 Drop EACH EYE QID PRN


Amlodipine (Amlodipine Besylate) 2.5 Mg Tab 2.5 Mg PO DAILY


Alfuzosin HCl ER (Alfuzosin HCl) 10 Mg Tab 10 Mg PO DAILY


Ventolin Hfa 18 GM Inh (Albuterol Sulfate) 90 Mcg/Act Aer 2 Puff INH QID PRN


Prazosin (Prazosin HCl) 5 Mg Cap 5 Mg PO HS


Topamax (Topiramate) 50 Mg Tab 50 Mg PO HS


Imitrex (Sumatriptan Succinate) 50 Mg Tab 50 Mg PO DAILY PRN


     If a satisfactory response has not been obtained at 2 hours, a second


     dose may be administered


Sennosides 8.6 Mg Tab 8.6 Mg PO HS


Lyrica (Pregabalin) 150 Mg Cap 150 Mg PO BID


Pantoprazole (Pantoprazole Sodium) 40 Mg Tab 40 Mg PO BID


Mobic (Meloxicam) 15 Mg Tab 15 Mg PO DAILY


ZyrTEC Itchy Eye Opth Drops (Ketotifen Opth Drops) 0.025% Drops 1 Drop EACH EYE 

TID


Proctofoam Topical (Rectal Foam) (Pramoxine Topical (Rectal Foam)) 1% Foam 1 

Applic TOPICAL QID PRN


Cetirizine (Cetirizine HCl) 10 Mg Tab 10 Mg PO DAILY


Carafate Liq (Sucralfate) 1 Gm/10 Ml Susp 1 Gm PO QID


     on empty stomach


Baclofen 10 Mg Tab 15 Mg PO TID PRN








Review of Systems


Except as stated in HPI:  all other systems reviewed are Neg


General / Constitutional:  No: Fever


Eyes:  No: Blurred Vision


HENT:  Positive: Headaches, No: Neck Pain


Cardiovascular:  Positive: Syncope (Possible syncopal), No: Chest Pain or 

Discomfort, Palpitations, Tachycardia, Diaphoresis


Respiratory:  No: Shortness of Breath


Gastrointestinal:  No: Nausea, Vomiting, Abdominal Pain


Neurologic:  Positive: Syncope (Possible syncope), Headache, Seizures (Possible 

seizure), No: Focal Abnormalities, Paresthesia, Sensory Disturbance





Physical Exam


Narrative


GENERAL: Awake, alert, pleasant 52-year-old male who appears his stated age and 

is in no acute respiratory distress.


SKIN: Focused skin assessment warm/dry.


HEAD: Atraumatic. Normocephalic.  No visible cephalohematoma noted.


EYES: Pupils equal and round.  Pupils are 4 mm bilateral and reactive.  No 

scleral icterus. No injection or drainage. 


ENT: No nasal bleeding or discharge.  Mucous membranes pink and moist.


NECK: Trachea midline. No JVD.  No tenderness of the midline of the cervical 

vertebrae.


CARDIOVASCULAR: Regular rate and rhythm.  No murmur appreciated.


RESPIRATORY: No accessory muscle use. Clear to auscultation. Breath sounds 

equal bilaterally. 


GASTROINTESTINAL: Abdomen soft, non-tender, nondistended.  No rebound 

tenderness.


MUSCULOSKELETAL: Tenderness over the mid right clavicle and lateral right 

shoulder.  He is able to move the arms and legs without difficulty.


NEUROLOGICAL: Awake and alert. No obvious cranial nerve deficits.  Motor 

grossly within normal limits. Normal speech.  Nonfocal.  Oriented 4.  NIHSS 0.


PSYCHIATRIC: Appropriate mood and affect; insight and judgment normal.





Data


Data


Last Documented VS





Vital Signs








  Date Time  Temp Pulse Resp B/P (MAP) Pulse Ox O2 Delivery O2 Flow Rate FiO2


 


4/11/18 09:15      Room Air  


 


4/11/18 08:36 98.3 64 17 142/83 (102) 98   








Orders





 Orders


Electrocardiogram (4/11/18 09:00)


Complete Blood Count With Diff (4/11/18 09:00)


Comprehensive Metabolic Panel (4/11/18 09:00)


Magnesium (Mg) (4/11/18 09:00)


Ckmb (Isoenzyme) Profile (4/11/18 09:00)


Troponin I (4/11/18 09:00)


Chest, Single Ap (4/11/18 09:00)


Ct Brain W/O Iv Contrast(Rout) (4/11/18 09:00)


Ecg Monitoring (4/11/18 09:00)


Iv Access Insert/Monitor (4/11/18 09:00)


Oximetry (4/11/18 09:00)


Ondansetron Inj (Zofran Inj) (4/11/18 09:00)


Sodium Chloride 0.9% Flush (Ns Flush) (4/11/18 09:00)


Sodium Chlor 0.9% 1000 Ml Inj (Ns 1000 M (4/11/18 09:00)


Orthostatic Vital Signs (4/11/18 09:00)


Morphine Inj (Morphine Inj) (4/11/18 09:00)


Shoulder, Limited(2vws) (4/11/18 )


Lactic Acid (4/11/18 09:00)


CKMB (4/11/18 09:00)


CKMB% (4/11/18 09:00)


Admit Order (Ed Use Only) (4/11/18 10:35)





Labs





Laboratory Tests








Test


  4/11/18


09:00 4/11/18


09:05


 


White Blood Count 6.0 TH/MM3  


 


Red Blood Count 4.99 MIL/MM3  


 


Hemoglobin 14.0 GM/DL  


 


Hematocrit 42.7 %  


 


Mean Corpuscular Volume 85.5 FL  


 


Mean Corpuscular Hemoglobin 28.1 PG  


 


Mean Corpuscular Hemoglobin


Concent 32.9 % 


  


 


 


Red Cell Distribution Width 15.2 %  


 


Platelet Count 170 TH/MM3  


 


Mean Platelet Volume 10.8 FL  


 


Neutrophils (%) (Auto) 55.6 %  


 


Lymphocytes (%) (Auto) 31.3 %  


 


Monocytes (%) (Auto) 9.5 %  


 


Eosinophils (%) (Auto) 2.7 %  


 


Basophils (%) (Auto) 0.9 %  


 


Neutrophils # (Auto) 3.3 TH/MM3  


 


Lymphocytes # (Auto) 1.9 TH/MM3  


 


Monocytes # (Auto) 0.6 TH/MM3  


 


Eosinophils # (Auto) 0.2 TH/MM3  


 


Basophils # (Auto) 0.1 TH/MM3  


 


CBC Comment DIFF FINAL  


 


Differential Comment   


 


Blood Urea Nitrogen 13 MG/DL  


 


Creatinine 1.10 MG/DL  


 


Random Glucose 91 MG/DL  


 


Total Protein 7.7 GM/DL  


 


Albumin 3.9 GM/DL  


 


Calcium Level 8.9 MG/DL  


 


Magnesium Level 2.2 MG/DL  


 


Alkaline Phosphatase 59 U/L  


 


Aspartate Amino Transf


(AST/SGOT) 17 U/L 


  


 


 


Alanine Aminotransferase


(ALT/SGPT) 21 U/L 


  


 


 


Total Bilirubin 0.3 MG/DL  


 


Sodium Level 139 MEQ/L  


 


Potassium Level 4.1 MEQ/L  


 


Chloride Level 107 MEQ/L  


 


Carbon Dioxide Level 28.6 MEQ/L  


 


Anion Gap 3 MEQ/L  


 


Estimat Glomerular Filtration


Rate 85 ML/MIN 


  


 


 


Total Creatine Kinase 200 U/L  


 


Creatine Kinase MB 3.0 NG/ML  


 


Troponin I


  LESS THAN 0.02


NG/ML 


 


 


Lactic Acid Level  1.1 mmol/L 











MDM


Medical Decision Making


Medical Screen Exam Complete:  Yes


Emergency Medical Condition:  Yes


Medical Record Reviewed:  Yes


Interpretation(s)


EKG reveals sinus bradycardia with a heart rate of 55.  Low QRS voltage in 

precordial leads.  Q-wave noted in lead III and aVF.  Inverted T-wave in lead 

III.





Chest x-ray reveals negative for acute process


X-ray of the right shoulder reveals no acute fracture or dislocation.  Mild 

degenerative changes involving the right acromioclavicular and glenohumeral 

joints.





Last Impressions








Head CT 4/11/18 0900 Signed





Impressions: 





 Service Date/Time:  Wednesday, April 11, 2018 09:30 - CONCLUSION:  No acute 





 disease.       Zev Allen MD 








Laboratory Tests








Test


  4/11/18


09:00 4/11/18


09:05


 


White Blood Count 6.0 TH/MM3  


 


Red Blood Count 4.99 MIL/MM3  


 


Hemoglobin 14.0 GM/DL  


 


Hematocrit 42.7 %  


 


Mean Corpuscular Volume 85.5 FL  


 


Mean Corpuscular Hemoglobin 28.1 PG  


 


Mean Corpuscular Hemoglobin


Concent 32.9 % 


  


 


 


Red Cell Distribution Width 15.2 %  


 


Platelet Count 170 TH/MM3  


 


Mean Platelet Volume 10.8 FL  


 


Neutrophils (%) (Auto) 55.6 %  


 


Lymphocytes (%) (Auto) 31.3 %  


 


Monocytes (%) (Auto) 9.5 %  


 


Eosinophils (%) (Auto) 2.7 %  


 


Basophils (%) (Auto) 0.9 %  


 


Neutrophils # (Auto) 3.3 TH/MM3  


 


Lymphocytes # (Auto) 1.9 TH/MM3  


 


Monocytes # (Auto) 0.6 TH/MM3  


 


Eosinophils # (Auto) 0.2 TH/MM3  


 


Basophils # (Auto) 0.1 TH/MM3  


 


CBC Comment DIFF FINAL  


 


Differential Comment   


 


Blood Urea Nitrogen 13 MG/DL  


 


Creatinine 1.10 MG/DL  


 


Random Glucose 91 MG/DL  


 


Total Protein 7.7 GM/DL  


 


Albumin 3.9 GM/DL  


 


Calcium Level 8.9 MG/DL  


 


Magnesium Level 2.2 MG/DL  


 


Alkaline Phosphatase 59 U/L  


 


Aspartate Amino Transf


(AST/SGOT) 17 U/L 


  


 


 


Alanine Aminotransferase


(ALT/SGPT) 21 U/L 


  


 


 


Total Bilirubin 0.3 MG/DL  


 


Sodium Level 139 MEQ/L  


 


Potassium Level 4.1 MEQ/L  


 


Chloride Level 107 MEQ/L  


 


Carbon Dioxide Level 28.6 MEQ/L  


 


Anion Gap 3 MEQ/L  


 


Estimat Glomerular Filtration


Rate 85 ML/MIN 


  


 


 


Total Creatine Kinase 200 U/L  


 


Creatine Kinase MB 3.0 NG/ML  


 


Troponin I


  LESS THAN 0.02


NG/ML 


 


 


Lactic Acid Level  1.1 mmol/L 








Differential Diagnosis


Differential diagnosis includes seizure, syncope, arrhythmia, clavicle fracture

, intracranial hemorrhage, subarachnoid hemorrhage, proximal humeral fracture, 

electrolyte abnormality.


Narrative Course


IV was established, labs are drawn and sent, and the patient was placed on 

cardiac telemetry monitoring and continuous pulse oximetry monitoring.  EKG was 

ordered and interpreted.  CT the brain was obtained.  Chest x-ray and right 

shoulder x-ray were obtained.  The patient was administered morphine 2 mg IV, 

Zofran 4 mg IV, was placed on maintenance IV fluids.  Labs are unremarkable.  

Lactic acid is normal, I would suspect that it would be elevated if patient had 

true seizure.  CT the brain is negative.  X-ray of the chest is unremarkable, 

no evidence of clavicle fracture.  It appears the patient had syncope versus 

seizure, will need cardiac telemetry monitoring and echocardiogram.  The [facility]  Therefore, the on-call medical service 

was paged for 23 hour observation for syncope and seizure workup.





Physician Communication


Physician Communication


The on-call medical service was paged for 23 hour observation.  I discussed the 

patient with Dr. Cruz who agrees with 23 hour observation.





Diagnosis





 Primary Impression:  


 Syncope


 Qualified Codes:  R55 - Syncope and collapse


Condition:  Stable











Mendez,Kt Z. MD Apr 11, 2018 09:09

## 2018-04-11 NOTE — RADRPT
EXAM DATE/TIME:  04/11/2018 18:26 

 

[facility] COMPARISON:     

No previous studies available for comparison.

        

 

 

INDICATIONS :     

Syncope. 

                     

 

MEDICAL HISTORY :     

Myocardial infarction. Hypertension. Sleep apnea. Neck pain. Head trauma. Migraines. Numbness. DVT. R
ectal bleeding. Ulcer. Hiatal hernia. GERD. Fibromyalgia. Arthritis. Depression. Anxiety. PTSD. Measl
es.  

 

SURGICAL HISTORY :     

Appendectomy. Hemorrhoidectomy.   Spinal stimulator to lower back. Abdominal laparotomy. Vasectomy. M
ultiple back surgeries. Right foot surgery. Sinus surgery. Bilateral shoulder surgery.   

 

ENCOUNTER:     

Initial

 

ACUITY:     

1 day

 

PAIN SCORE:     

0/10

 

LOCATION:     

Bilateral neck 

                     

PEAK SYSTOLIC VELOCITIES (cm/sec):

 

ICA/CCA RATIO:                    

Right: 0.6     Left: 0.7

 

ICA:                          

Right: 66     Left: 74

 

CCA:                          

Right: 106     Left: 104

 

ECA:                           

Right: 93     Left: 44

 

VERTEBRAL:           

Right: 61 antegrade     Left: 40 antegrade

   

Elevated flow velocities and ICA/CCA ratios have been found to correlate with increased degrees of

vessel stenosis, calculated as percentage of diameter relative to a normal segment of distal ICA/CCA

 

FINDINGS:     

 

RIGHT CAROTID:     

No significant stenosis is visualized.  The waveforms are within normal limits.

 

LEFT CAROTID:     

No significant stenosis is visualized.  The waveforms are within normal limits.

 

VERTEBRAL ARTERIES:  

Antegrade flow is seen in both vertebral arteries.

 

MISCELLANEOUS:     

None.

 

CONCLUSION:     

Normal examination for a patient of this age.  

 

 

 

 Garett Bryant MD on April 11, 2018 at 19:20           

Board Certified Radiologist.

 This report was verified electronically.

## 2018-04-11 NOTE — HHI.HP
__________________________________________________





[facility]


Service


[facility]


Primary Care Physician


[facility]


Admission Diagnosis





Syncope versus seizure


Diagnoses:  


Chief Complaint:  


Passed out


Travel History


International Travel<30 Days:  No


Contact w/Intl Traveler <30 Da:  No


Traveled to Known Affected Are:  No


History of Present Illness


The patient is a 52-year-old male with a past medical history of hypertension, 

migraines and PTSD who is presenting to the hospital following a syncopal 

episode.  The patient says that this morning he was cleaning the litter box 1 

all of a sudden he fell to the ground.  He denied feeling any symptoms prior to 

the event.  His family member found him and described his eyes as rolling to 

the back of his head.  Apparently he was awake but quite confused.  He remained 

in a relatively unresponsive state for 15 minutes.  His family member applied 

cold water on his face and he slowly came to.  He felt weakness and 

unsteadiness.  He said during the fall he hit the right side of his head and 

also the right shoulder.  The patient experienced pain in those areas.  He 

denied any bleeding.  The patient states that he has had episodes in the past 

secondary to his PTSD and panic attacks.  He says he did not pass out with 

those episodes but would be incapacitated for short while.  He says those 

episodes are triggered by flashbacks.  He says he has a history of migraines 

and [facility]





Review of Systems


Except as stated in HPI:  all other systems reviewed are Neg





Past Family Social History


Past Medical History


PTSD


Migraines


Diabetes, diet controlled


GERD status post Nissen fundoplication in 2016


Hypertension


Rectal hemorrhoids/fissures


Past Surgical History


Back surgery 4


Bilateral rotator cuff repair


Sinus surgery


Allergies:  


Coded Allergies:  


     gabapentin (Unverified  Allergy, Intermediate, VOMITING, 18)


     oxycodone (Unverified  Allergy, Mild, ITCHY, 18)


Active Ordered Medications





Current Medications








 Medications


  (Trade)  Dose


 Ordered  Sig/Brunilda


 Route  Start Time


 Stop Time Status Last Admin


 


 Sodium Chloride  1,000 ml @ 


 100 mls/hr  Q10H ONCE


 IV  18 09:00


 18 18:59  18 09:16


 


 


  (Proair Hfa Inh)  2 puff  QID  PRN


 INH  18 11:00


     


 


 


  (Norvasc)  2.5 mg  DAILY


 PO  18 09:00


     


 


 


  (Refresh Tears


 0.5% Opth Soln)  1 drop  QID  PRN


 EACH EYE  18 11:00


     


 


 


  (Vitamin D3)  2,000 units  BID


 PO  18 21:00


     


 


 


  (Mobic)  15 mg  DAILY


 PO  18 09:00


     


 


 


  (Reglan)  10 mg  QID  PRN


 PO  18 11:00


     


 


 


  (Protonix)  40 mg  BID


 PO  18 21:00


     


 


 


  (Miralax)  17 gm  DAILY


 PO  18 09:00


     


 


 


  (Minipress)  5 mg  HS


 PO  18 21:00


     


 


 


  (Lyrica)  150 mg  BID


 PO  18 21:00


     


 


 


  (Senokot)  8.6 mg  HS


 PO  18 21:00


     


 


 


  (Carafate Liq)  1 gm  QID


 PO  18 13:00


    18 14:34


 


 


  (Topamax)  50 mg  HS


 PO  18 21:00


     


 


 


  (Flonase Red Spr)  2 spray  DAILY


 EACH NARE  18 09:00


     


 


 


  (Patanol 0.1%


 Opth)  1 drop  BID


 EACH EYE  18 21:00


     


 


 


  (Detrol La)  4 mg  DAILY


 PO  18 09:00


     


 


 


  (Effexor Xr)  225 mg  DAILY


 PO  18 09:00


     


 


 


 Sodium Chloride  1,000 ml @ 


 100 mls/hr  Q10H


 IV  18 11:03


 18 07:02  18 11:03


 


 


  (NS Flush)  2 ml  UNSCH  PRN


 IV FLUSH  18 11:15


     


 


 


  (NS Flush)  2 ml  BID


 IV FLUSH  18 21:00


     


 


 


  (Tylenol)  650 mg  Q4H  PRN


 PO  18 11:15


     


 


 


  (Lovenox Inj)  40 mg  Q24H


 SQ  18 13:00


     


 


 


  (Tylenol)  650 mg  Q6H  PRN


 PO  18 11:15


     


 


 


  (Narcan Inj)  0.4 mg  UNSCH  PRN


 IV PUSH  18 11:15


     


 


 


  (Pill Splitter)  1 ea  UNSCH  PRN


 OTHER  18 13:15


     


 








Family History


Breast cancer


Alzheimer's


CAD


Social History


The patient does not smoke cigarettes.  He endorses social alcohol use.  He 

denies illicit substances.





Physical Exam


Vital Signs





Vital Signs








  Date Time  Temp Pulse Resp B/P (MAP) Pulse Ox O2 Delivery O2 Flow Rate FiO2


 


18 12:37 97.8 57 18 110/72 (85) 97   





    131/80 (97)    





    125/81 (96)    


 


18 09:15      Room Air  


 


18 08:36 98.3 64 17 142/83 (102) 98   








Physical Exam


GENERAL: This is a well-nourished, well-developed patient, in no apparent 

distress.


SKIN: No rashes, ecchymoses or lesions. Cool and dry.


HEAD: Atraumatic. Normocephalic. No temporal or scalp tenderness.


EYES: Pupils equal round and reactive. Extraocular motions intact. No scleral 

icterus. No injection or drainage. 


ENT: Nose without bleeding, purulent drainage or septal hematoma. Throat 

without erythema, tonsillar hypertrophy or exudate. Uvula midline. Airway 

patent.


NECK: Trachea midline. No JVD or lymphadenopathy. Supple, nontender, no 

meningeal signs.


CARDIOVASCULAR: Regular rate and rhythm without murmurs, gallops, or rubs. 


RESPIRATORY: Clear to auscultation. Breath sounds equal bilaterally. No wheezes

, rales, or rhonchi.  


GASTROINTESTINAL: Abdomen soft, non-tender, nondistended. No hepato-splenomegaly

, or palpable masses. No guarding.


MUSCULOSKELETAL: Extremities without clubbing, cyanosis, or edema. No joint 

tenderness, effusion, or edema noted.


NEUROLOGICAL: Awake and alert. Cranial nerves II through XII intact.  Motor and 

sensory grossly within normal limits. Five out of 5 muscle strength in all 

muscle groups.  Normal speech.


PSYCH: Mood and affect appropriate.


Laboratory





Laboratory Tests








Test


  18


09:00 18


09:05 18


14:45 18


15:00


 


White Blood Count 6.0    


 


Red Blood Count 4.99    


 


Hemoglobin 14.0    


 


Hematocrit 42.7    


 


Mean Corpuscular Volume 85.5    


 


Mean Corpuscular Hemoglobin 28.1    


 


Mean Corpuscular Hemoglobin


Concent 32.9 


  


  


  


 


 


Red Cell Distribution Width 15.2    


 


Platelet Count 170    


 


Mean Platelet Volume 10.8    


 


Neutrophils (%) (Auto) 55.6    


 


Lymphocytes (%) (Auto) 31.3    


 


Monocytes (%) (Auto) 9.5    


 


Eosinophils (%) (Auto) 2.7    


 


Basophils (%) (Auto) 0.9    


 


Neutrophils # (Auto) 3.3    


 


Lymphocytes # (Auto) 1.9    


 


Monocytes # (Auto) 0.6    


 


Eosinophils # (Auto) 0.2    


 


Basophils # (Auto) 0.1    


 


CBC Comment DIFF FINAL    


 


Differential Comment     


 


Blood Urea Nitrogen 13    


 


Creatinine 1.10    


 


Random Glucose 91    


 


Total Protein 7.7    


 


Albumin 3.9    


 


Calcium Level 8.9    


 


Magnesium Level 2.2    


 


Alkaline Phosphatase 59    


 


Aspartate Amino Transf


(AST/SGOT) 17 


  


  


  


 


 


Alanine Aminotransferase


(ALT/SGPT) 21 


  


  


  


 


 


Total Bilirubin 0.3    


 


Sodium Level 139    


 


Potassium Level 4.1    


 


Chloride Level 107    


 


Carbon Dioxide Level 28.6    


 


Anion Gap 3    


 


Estimat Glomerular Filtration


Rate 85 


  


  


  


 


 


Total Creatine Kinase 200    


 


Creatine Kinase MB 3.0    


 


Troponin I LESS THAN 0.02    


 


Lactic Acid Level  1.1   


 


Urine Color    YELLOW 


 


Urine Turbidity    CLEAR 


 


Urine pH    6.0 


 


Urine Specific Gravity    1.012 


 


Urine Protein    NEG 


 


Urine Glucose (UA)    NEG 


 


Urine Ketones    NEG 


 


Urine Occult Blood    NEG 


 


Urine Nitrite    NEG 


 


Urine Bilirubin    NEG 


 


Urine Urobilinogen    LESS THAN 2.0 


 


Urine Leukocyte Esterase    NEG 


 


Urine WBC    1 


 


Urine Mucus    FEW 


 


Microscopic Urinalysis Comment


  


  


  


  CULT NOT


INDICATED








Result Diagram:  


18





Imaging





Last Impressions








Head CT 18 Signed





Impressions: 





 Service Date/Time:  2018 09:30 - CONCLUSION:  No acute 





 disease.       Zev Allen MD 


 


Chest X-Ray 18 09 Signed





Impressions: 





 Service Date/Time:  2018 09:20 - CONCLUSION:  Negative 

for 





 acute process.     aSw Landis MD  FACR


 


Shoulder X-Ray 18 0000 Signed





Impressions: 





 Service Date/Time:  2018 09:22 - CONCLUSION:  1. No acute 





 fracture or dislocation.  2. Mild degenerative changes involving right 





 acromioclavicular glenohumeral joints.     Zev Allen MD 











Caprini VTE Risk Assessment


Caprini VTE Risk Assessment:  Mod/High Risk (score >= 2)


Caprini Risk Assessment Model











 Point Value = 1          Point Value = 2  Point Value = 3  Point Value = 5


 


Age 41-60


Minor surgery


BMI > 25 kg/m2


Swollen legs


Varicose veins


Pregnancy or postpartum


History of unexplained or recurrent


   spontaneous 


Oral contraceptives or hormone


   replacement


Sepsis (< 1 month)


Serious lung disease, including


   pneumonia (< 1 month)


Abnormal pulmonary function


Acute myocardial infarction


Congestive heart failure (< 1 month)


History of inflammatory bowel disease


Medical patient at bed rest Age 61-74


Arthroscopic surgery


Major open surgery (> 45 min)


Laparoscopic surgery (> 45 min)


Malignancy


Confined to bed (> 72 hours)


Immobilizing plaster cast


Central venous access Age >= 75


History of VTE


Family history of VTE


Factor V Leiden


Prothrombin [id number]


Lupus anticoagulant


Anticardiolipin antibodies


Elevated serum homocysteine


Heparin-induced thrombocytopenia


Other congenital or acquired


   thrombophilia Stroke (< 1 month)


Elective arthroplasty


Hip, pelvis, or leg fracture


Acute spinal cord injury (< 1 month)








Prophylaxis Regimen











   Total Risk


Factor Score Risk Level Prophylaxis Regimen


 


0-1      Low Early ambulation


 


2 Moderate Order ONE of the following:


*Sequential Compression Device (SCD)


*Heparin 5000 units SQ BID


 


3-4 Higher Order ONE of the following medications:


*Heparin 5000 units SQ TID


*Enoxaparin/Lovenox 40 mg SQ daily (WT < 150 kg, CrCl > 30 mL/min)


*Enoxaparin/Lovenox 30 mg SQ daily (WT < 150 kg, CrCl > 10-29 mL/min)


*Enoxaparin/Lovenox 30 mg SQ BID (WT < 150 kg, CrCl > 30 mL/min)


AND/OR


*Sequential Compression Device (SCD)


 


5 or more Highest Order ONE of the following medications:


*Heparin 5000 units SQ TID (Preferred with Epidurals)


*Enoxaparin/Lovenox 40 mg SQ daily (WT < 150 kg, CrCl > 30 mL/min)


*Enoxaparin/Lovenox 30 mg SQ daily (WT < 150 kg, CrCl > 10-29 mL/min)


*Enoxaparin/Lovenox 30 mg SQ BID (WT < 150 kg, CrCl > 30 mL/min)


AND


*Sequential Compression Device (SCD)











Assessment and Plan


Assessment and Plan


Syncopal episode


Unsure of etiology.  The patient's family member describes his eyes rolling to 

the back of his head.  Concern for seizure.  Not found to be orthostatic.  He 

says he has had a cardiac workup in the past and does not believe it is 

secondary to his heart.


- EEG pending.


- Carotid ultrasound pending.


- Continue to monitor orthostatic vital signs.


- Monitor on telemetry.  Trend troponins.


- Monitor glucose every 6 hours for now.


- Check lipid profile and hemoglobin A1c.


- PT eval.


- hold off on echo per patient request. 





HTN


Well controlled at this time.


- resume home regimen but monitor orthostatics. 





PTSD


S/t experience in the . 


- anxiolytics as needed.





PPx: SCDs


Code Status


Full


Discussed Condition With


Patient, Zachery Connolly DO 2018 16:12

## 2018-04-11 NOTE — MG
cc:

Samuel Oliveira MD

****

 

 

EEG RECORD #

 

YOB: 1965

 

7-9 Hz activity 20-50 microvolts, sharply contoured waveforms 

occurring in the frontal region ____ desynchronized theta activity 

occurring.  Frontal sharp waves ____72.  Driving with photic 

stimulation.  Single lead EKG showing sinus rhythm.  Appropriate EEG 

buildup, variability and reactivity.

 

INTERPRETATION:

Potential mild frontal cortical irritability.  There were nonspecific 

findings, otherwise stable, awake, drowsy electroencephalogram.  

Clinical correlation.

 

 

__________________________________

Samuel Oliveira MD

 

 

MG/rt

D: 04/11/2018, 09:31 PM

T: 04/11/2018, 09:50 PM

Visit #: [id number]

Job #: [id number]

## 2018-04-11 NOTE — RADRPT
EXAM DATE/TIME:  04/11/2018 09:22 

 

[facility] COMPARISON:     

No previous studies available for comparison.

 

                     

INDICATIONS :     

Fall. Right shoulder pain.

                     

 

MEDICAL HISTORY :     

Hypertension.  Ulcers.  Hiatal hernia.   Deep veinous thrombosis.    

 

SURGICAL HISTORY :     

Appendectomy.   Pain stimulator. Bicep tendon repair, bilateral.

 

ENCOUNTER:     

Initial                                        

 

ACUITY:     

1 day      

 

PAIN SCORE:     

9/10

 

LOCATION:     

Right  shoulder, gh joint

 

FINDINGS:     

Mild degenerative changes are noted involving the right acromioclavicular and glenohumeral joints. Pr
evious right shoulder surgery is noted. No acute fracture or dislocation is noted.

 

CONCLUSION:     

1. No acute fracture or dislocation. 

2. Mild degenerative changes involving right acromioclavicular glenohumeral joints. 

 

 

 Zev Allen MD on April 11, 2018 at 10:03           

Board Certified Radiologist.

 This report was verified electronically.

## 2018-04-11 NOTE — RADRPT
EXAM DATE/TIME:  04/11/2018 09:30 

 

[facility] COMPARISON:     

CT BRAIN W/O CONTRAST, April 07, 2016, 12:51.

 

 

INDICATIONS :     

Found down - possible seizure/syncopal episode.  Cephalgia. 

                      

 

RADIATION DOSE:     

41.61 CTDIvol (mGy) 

 

 

 

MEDICAL HISTORY :     

Hypertension.  

 

SURGICAL HISTORY :      

Appendectomy. 

 

ENCOUNTER:      

Initial

 

ACUITY:      

1 day

 

PAIN SCALE:      

3/10

 

LOCATION:        

cranial 

 

TECHNIQUE:     

Multiple contiguous axial images were obtained of the head.  Using automated exposure control and adj
ustment of the mA and/or kV according to patient size, radiation dose was kept as low as reasonably a
chievable to obtain optimal diagnostic quality images.   DICOM format image data is available electro
nically for review and comparison.  

 

FINDINGS:     

 

CEREBRUM:     

The ventricles are normal for age.  No evidence of midline shift, mass lesion, hemorrhage or acute in
farction.  No extra-axial fluid collections are seen.

 

POSTERIOR FOSSA:     

The cerebellum and brainstem are intact.  The 4th ventricle is midline.  The cerebellopontine angle i
s unremarkable.

 

EXTRACRANIAL:     

The visualized portion of the orbits is intact.

 

SKULL:     

The calvaria is intact.  No evidence of skull fracture.

 

CONCLUSION:     

No acute disease.  

 

 

 

 Zev Allen MD on April 11, 2018 at 9:36           

Board Certified Radiologist.

 This report was verified electronically.

## 2018-04-11 NOTE — EKG
Date Performed: 04/11/2018       Time Performed: 09:05:46

 

PTAGE:      52 years

 

EKG:      SINUS BRADYCARDIA LOW QRS VOLTAGE IN PRECORDIAL LEADS INFERIOR MYOCARDIAL INFARCTION ABNORM
AL ECG

 

PREVIOUS TRACING       : 04/05/2018 12.11 Since the previous tracing, no significant change noted

 

DOCTOR:   Martínez Mariano  Interpretating Date/Time  04/11/2018 20:56:47

## 2018-04-12 VITALS
OXYGEN SATURATION: 98 % | SYSTOLIC BLOOD PRESSURE: 132 MMHG | RESPIRATION RATE: 16 BRPM | HEART RATE: 54 BPM | DIASTOLIC BLOOD PRESSURE: 81 MMHG | TEMPERATURE: 97.4 F

## 2018-04-12 VITALS — HEART RATE: 51 BPM

## 2018-04-12 VITALS — HEART RATE: 42 BPM

## 2018-04-12 VITALS — SYSTOLIC BLOOD PRESSURE: 119 MMHG | DIASTOLIC BLOOD PRESSURE: 79 MMHG

## 2018-04-12 VITALS
TEMPERATURE: 98.1 F | OXYGEN SATURATION: 95 % | HEART RATE: 56 BPM | DIASTOLIC BLOOD PRESSURE: 80 MMHG | RESPIRATION RATE: 18 BRPM | SYSTOLIC BLOOD PRESSURE: 123 MMHG

## 2018-04-12 VITALS — RESPIRATION RATE: 20 BRPM

## 2018-04-12 VITALS — HEART RATE: 65 BPM

## 2018-04-12 LAB — HBA1C MFR BLD: 5.5 % (ref 4.3–6)

## 2018-04-12 RX ADMIN — PHENYTOIN SODIUM SCH MLS/HR: 50 INJECTION INTRAMUSCULAR; INTRAVENOUS at 02:46

## 2018-04-12 RX ADMIN — ENOXAPARIN SODIUM SCH MG: 40 INJECTION SUBCUTANEOUS at 12:37

## 2018-04-12 RX ADMIN — SUCRALFATE SCH GM: 1 SUSPENSION ORAL at 12:37

## 2018-04-12 RX ADMIN — SUCRALFATE SCH GM: 1 SUSPENSION ORAL at 09:00

## 2018-04-12 RX ADMIN — Medication SCH ML: at 09:00

## 2018-04-12 RX ADMIN — PREGABALIN SCH MG: 75 CAPSULE ORAL at 09:22

## 2018-04-12 RX ADMIN — OLOPATADINE HYDROCHLORIDE SCH DROP: 1 SOLUTION/ DROPS OPHTHALMIC at 09:00

## 2018-04-12 RX ADMIN — VITAMIN D, TAB 1000IU (100/BT) SCH UNITS: 25 TAB at 09:22

## 2018-04-12 RX ADMIN — PANTOPRAZOLE SCH MG: 40 TABLET, DELAYED RELEASE ORAL at 09:21

## 2018-04-12 NOTE — HHI.PR
Subjective


Remarks


The pt complained of right shoulder pain that is somewhat chronic but was made 

worse by the fall. He said his heart rate has been slow. No other acute 

complaints. Looking forward to going home. Discussed with nursing.





Objective


Vitals





Vital Signs








  Date Time  Temp Pulse Resp B/P (MAP) Pulse Ox O2 Delivery O2 Flow Rate FiO2


 


4/12/18 13:45   20     


 


4/12/18 08:00 97.4 54 16 132/81 (98) 98   


 


4/12/18 04:35    116/76 (89)    





    119/82 (94)    





    116/79 (91)    


 


4/12/18 04:16 98.1 56 18 123/80 (94) 95   


 


4/12/18 04:05  51      


 


4/12/18 02:30  42      


 


4/12/18 00:05  65      


 


4/11/18 21:22 97.5 57 18 125/74 (91) 96   


 


4/11/18 20:30  60      


 


4/11/18 17:35 97.5 56 18 131/86 (101) 98   


 


4/11/18 16:43 98.0 59 18 108/74 (85) 97   














I/O      


 


 4/11/18 4/11/18 4/11/18 4/12/18 4/12/18 4/12/18





 07:00 15:00 23:00 07:00 15:00 23:00


 


Intake Total   1000 ml 1000 ml 800 ml 


 


Balance   1000 ml 1000 ml 800 ml 


 


      


 


Intake IV Total   1000 ml 1000 ml 800 ml 


 


# Voids     1 








Result Diagram:  


4/11/18 0900 4/11/18 0900





Imaging





Last Impressions








Upper Extremity CT 4/12/18 0000 Signed





Impressions: 





 Service Date/Time:  Thursday, April 12, 2018 10:26 - CONCLUSION:  1. No acute 





 fracture or distention.  2. Mild degenerative changes are noted involving the 





 right acromioclavicular and glenohumeral joints.     Zev Allen MD 


 


Head CT 4/11/18 0900 Signed





Impressions: 





 Service Date/Time:  Wednesday, April 11, 2018 09:30 - CONCLUSION:  No acute 





 disease.       Zev Allen MD 


 


Chest X-Ray 4/11/18 0900 Signed





Impressions: 





 Service Date/Time:  Wednesday, April 11, 2018 09:20 - CONCLUSION:  Negative 

for 





 acute process.     Saw Landis MD  FACR


 


Shoulder X-Ray 4/11/18 0000 Signed





Impressions: 





 Service Date/Time:  Wednesday, April 11, 2018 09:22 - CONCLUSION:  1. No acute 





 fracture or dislocation.  2. Mild degenerative changes involving right 





 acromioclavicular glenohumeral joints.     Zev Allen MD 


 


Carotid Artery Ultrasound 4/11/18 0000 Signed





Impressions: 





 Service Date/Time:  Wednesday, April 11, 2018 18:26 - CONCLUSION:  Normal 





 examination for a patient of this age.       Garett Bryant MD 








Objective Remarks


GENERAL: This is a well-nourished, well-developed patient, in no apparent 

distress.


SKIN: No rashes, ecchymoses or lesions. Cool and dry.


HEAD: Atraumatic. Normocephalic. No temporal or scalp tenderness.


EYES: Pupils equal round and reactive. Extraocular motions intact. No scleral 

icterus. No injection or drainage. 


ENT: Nose without bleeding, purulent drainage or septal hematoma. Throat 

without erythema, tonsillar hypertrophy or exudate. Uvula midline. Airway 

patent.


NECK: Trachea midline. No JVD or lymphadenopathy. Supple, nontender, no 

meningeal signs.


CARDIOVASCULAR: Bradycardic without murmurs, gallops, or rubs. 


RESPIRATORY: Clear to auscultation. Breath sounds equal bilaterally. No wheezes

, rales, or rhonchi.  


GASTROINTESTINAL: Abdomen soft, non-tender, nondistended. No hepato-splenomegaly

, or palpable masses. No guarding.


MUSCULOSKELETAL: Extremities without clubbing, cyanosis, or edema. No joint 

tenderness, effusion, or edema noted.


NEUROLOGICAL: Awake and alert. Cranial nerves II through XII intact.  Motor and 

sensory grossly within normal limits. Five out of 5 muscle strength in all 

muscle groups.  Normal speech.


PSYCH: Mood and affect appropriate.


Medications and IVs





Current Medications








 Medications


  (Trade)  Dose


 Ordered  Sig/Brunilda


 Route  Start Time


 Stop Time Status Last Admin


 


  (Proair Hfa Inh)  2 puff  QID  PRN


 INH  4/11/18 11:00


     


 


 


  (Norvasc)  2.5 mg  DAILY


 PO  4/12/18 09:00


     


 


 


  (Refresh Tears


 0.5% Opth Soln)  1 drop  QID  PRN


 EACH EYE  4/11/18 11:00


     


 


 


  (Vitamin D3)  2,000 units  BID


 PO  4/11/18 21:00


    4/12/18 09:22


 


 


  (Reglan)  10 mg  QID  PRN


 PO  4/11/18 11:00


     


 


 


  (Protonix)  40 mg  BID


 PO  4/11/18 21:00


    4/12/18 09:21


 


 


  (Miralax)  17 gm  DAILY


 PO  4/12/18 09:00


    4/12/18 09:22


 


 


  (Minipress)  5 mg  HS


 PO  4/11/18 21:00


     


 


 


  (Lyrica)  150 mg  BID


 PO  4/11/18 21:00


    4/12/18 09:22


 


 


  (Senokot)  8.6 mg  HS


 PO  4/11/18 21:00


     


 


 


  (Carafate Liq)  1 gm  QID


 PO  4/11/18 13:00


    4/11/18 14:34


 


 


  (Topamax)  50 mg  HS


 PO  4/11/18 21:00


     


 


 


  (Flonase Red Spr)  2 spray  DAILY


 EACH NARE  4/12/18 09:00


     


 


 


  (Patanol 0.1%


 Opth)  1 drop  BID


 EACH EYE  4/11/18 21:00


     


 


 


  (Detrol La)  4 mg  DAILY


 PO  4/12/18 09:00


     


 


 


  (Effexor Xr)  225 mg  DAILY


 PO  4/12/18 09:00


     


 


 


  (NS Flush)  2 ml  UNSCH  PRN


 IV FLUSH  4/11/18 11:15


     


 


 


  (NS Flush)  2 ml  BID


 IV FLUSH  4/11/18 21:00


     


 


 


  (Tylenol)  650 mg  Q4H  PRN


 PO  4/11/18 11:15


     


 


 


  (Lovenox Inj)  40 mg  Q24H


 SQ  4/11/18 13:00


     


 


 


  (Tylenol)  650 mg  Q6H  PRN


 PO  4/11/18 11:15


    4/11/18 22:38


 


 


  (Narcan Inj)  0.4 mg  UNSCH  PRN


 IV PUSH  4/11/18 11:15


     


 


 


  (Pill Splitter)  1 ea  UNSCH  PRN


 OTHER  4/11/18 13:15


     


 











A/P


Assessment and Plan


Syncopal episode


Unsure of etiology.  The patient's family member describes his eyes rolling to 

the back of his head.  Concern for seizure.  Not found to be orthostatic.  He 

says he has had a cardiac workup in the past and does not believe it is 

secondary to his heart. EEG with mild frontal cortical irritability, 

nonspecific finding. Echo with normal EF. Carotid Duplex unremarkable. Trops 

flat. Mild bradycardia on telemetry. LDL not elevated. Blood sugar well 

controlled. Possibly vasovagal or medication related. Has had mini-episodes s/t 

PTSD so may be a component.


- Check hemoglobin A1c.


- follow up with PCP.





HTN


Well controlled at this time.


- resume home regimen. 





PTSD


S/t experience in the . 


- anxiolytics as needed. Stable.


- outpt follow-up with psychiatry. 





PPx: SCDs


Discharge Planning


D/c home











Zachery Cruz DO Apr 12, 2018 14:31

## 2018-04-12 NOTE — RADRPT
EXAM DATE/TIME:  04/12/2018 10:26 

 

[facility] COMPARISON:     

SHOULDER RIGHT LTD (2VWS), April 11, 2018, 9:22.

 

 

INDICATIONS :     

Right shoulder pain after syncopal episode yesterday.

                      

 

RADIATION DOSE:     

32.68 CTDIvol (mGy) 

 

 

MEDICAL HISTORY :     

Cardiovascular disease.  Hypertension. 

 

SURGICAL HISTORY :         

Bilateral rotator cuff

 

ENCOUNTER:      

Initial

 

ACUITY:      

2 days

 

PAIN SCALE:      

5/10

 

LOCATION:       

Right  shoulder

 

TECHNIQUE:     

Volumetric scanning of the shoulder was performed.  Using automated exposure control and adjustment o
f the mA and/or kV according to patient size, radiation dose was kept as low as reasonably achievable
 to obtain optimal diagnostic quality images.   DICOM format image data is available electronically f
or review and comparison.  

 

FINDINGS:     

 

BONES:     

No evidence of fracture.  Alignment is within normal limits.

 

JOINTS:     

Mild degenerative changes are noted involving the right acromioclavicular and glenohumeral joints.

 

SOFT TISSUES:     

The patient is status post biceps tendon repair. Muscles, tendons, and neurovascular structures are g
rossly unremarkable.  The integrity of the rotator cuff tendons cannot be reliably evaluated on CT wi
thout intra-articular contrast.  No evidence of mass, organized fluid collection, or foreign body.

 

CONCLUSION:     

1. No acute fracture or distention. 

2. Mild degenerative changes are noted involving the right acromioclavicular and glenohumeral joints.


 

 

 

 Zev Allen MD on April 12, 2018 at 11:09           

Board Certified Radiologist.

 This report was verified electronically.

## 2018-04-12 NOTE — HHI.DCPOC
Discharge Care Plan


Diagnosis:  


(1) Right shoulder pain


(2) Syncope


(3) Bradycardia


Goals to Promote Your Health


* To prevent worsening of your condition and complications


* To maintain your health at the optimal level


Directions to Meet Your Goals


*** Take your medications as prescribed


*** Follow your dietary instruction


*** Follow activity as directed








*** Keep your appointments as scheduled


*** Take your immunizations and boosters as scheduled


*** If your symptoms worsen call your PCP, if no PCP go to Urgent Care Center 

or Emergency Room***


*** Smoking is Dangerous to Your Health. Avoid second hand smoke***


***Call the 24-hour hour crisis hotline for domestic abuse at 1-429.345.3054***











Zachery Cruz DO Apr 12, 2018 14:23

## 2018-04-12 NOTE — ECHRPT
Indication:   CVA/TIA

 

 CONCLUSIONS

 Normal left ventricular size and wall thickness. The left ventricular systolic function is normal wi
th an 

 estimated ejection fraction in the range of 60-65%. Left ventricular diastolic function parameters a
re normal. 

 There is trace tricuspid valve regurgitation. 

 The estimated pulmonary arterial pressure is 27 mmHg. 

 

 BP:        /         HR:                          Rhythm:           Sinus

 

 MEASUREMENTS  (Male / Female) Normal Values       Technical Quality:Good

 2D ECHO

 LV Diastolic Diameter PLAX        4.7 cm                4.2 - 5.9 / 3.9 - 5.3 cm

 LV Systolic Diameter PLAX         3.3 cm                

 IVS Diastolic Thickness           0.9 cm                0.6 - 1.0 / 0.6 - 0.9 cm

 LVPW Diastolic Thickness          0.9 cm                0.6 - 1.0 / 0.6 - 0.9 cm

 LV Relative Wall Thickness        0.4                   

 RV Internal Dim ED PLAX           2.8 cm                

 LVOT Diameter                     2.1 cm                

 Aortic Root Diameter              2.8 cm                

 LA Systolic Diameter LX           3.1 cm                3.0 - 4.0 / 2.7 - 3.8 cm

 

 M-MODE

 AV Cusp Separation MM             2.1 cm                

 

 DOPPLER

 AV Peak Velocity                  149.0 cm/s            

 AV Peak Gradient                  8.9 mmHg              

 AV Mean Gradient                  5.0 mmHg              

 AV Velocity Time Integral         25.9 cm               

 LVOT Peak Velocity                103.0 cm/s            

 LVOT Peak Gradient                4.2 mmHg              

 LVOT Velocity Time Integral       18.3 cm               

 AV Area Cont Eq vti               2.4 cm               

 AV Area Cont Eq pk                2.4 cm               

 Mitral E Point Velocity           80.0 cm/s             

 Mitral A Point Velocity           81.9 cm/s             

 Mitral E to A Ratio               1.0                   

 TR Peak Velocity                  206.0 cm/s            

 TR Peak Gradient                  17.0 mmHg             

 Right Atrial Pressure             10.0 mmHg             

 Pulmonary Artery Systolic Pressu  27.0 mmHg             

 Right Ventricular Systolic Press  27.0 mmHg             

 PV Peak Velocity                  67.7 cm/s             

 PV Peak Gradient                  1.8 mmHg              

 

 

 FINDINGS

 

 LEFT VENTRICLE

 Normal left ventricular size and wall thickness. The left ventricular systolic function is normal wi
th an 

 estimated ejection fraction in the range of 60-65%. Left ventricular diastolic function parameters a
re normal. 

 

 RIGHT VENTRICLE

 Normal right ventricular size and systolic function.  

 

 LEFT ATRIUM

 The left atrial size is normal.  

 

 RIGHT ATRIUM

 The right atrial size is normal.  

 

 ATRIAL SEPTUM

 Normal atrial septal thickness without atrial level shunting by limited color doppler interrogation.
  

 

 AORTA

 The aortic root and proximal ascending aorta are normal in size on limited imaging.  

 

 MITRAL VALVE

 Structurally normal mitral valve. No mitral valve stenosis or regurgitation.  

 

 AORTIC VALVE

 Trileaflet aortic valve. No aortic valve stenosis or regurgitation.  

 

 TRICUSPID VALVE

 There is trace tricuspid valve regurgitation. 

 The estimated pulmonary arterial pressure is 27 mmHg. 

 

 PULMONARY VALVE

 No pulmonary valve regurgitation or stenosis.  

 

 VESSELS

 The inferior vena cava is normal in size.  

 

 PERICARDIUM

 No pericardial effusion.  

 

 

 

 

  Margarito Herrera MD, FACC

  (Electronically Signed)

  Final Date:12 April 2018 12:59

## 2018-06-07 ENCOUNTER — HOSPITAL ENCOUNTER (EMERGENCY)
Dept: HOSPITAL 17 - NEPD | Age: 53
Discharge: HOME | End: 2018-06-07
Payer: OTHER GOVERNMENT

## 2018-06-07 VITALS
TEMPERATURE: 98.2 F | OXYGEN SATURATION: 97 % | RESPIRATION RATE: 22 BRPM | HEART RATE: 76 BPM | DIASTOLIC BLOOD PRESSURE: 89 MMHG | SYSTOLIC BLOOD PRESSURE: 150 MMHG

## 2018-06-07 VITALS — BODY MASS INDEX: 32.87 KG/M2 | WEIGHT: 209.44 LBS | HEIGHT: 67 IN

## 2018-06-07 DIAGNOSIS — I10: ICD-10-CM

## 2018-06-07 DIAGNOSIS — Z79.899: ICD-10-CM

## 2018-06-07 DIAGNOSIS — M54.5: Primary | ICD-10-CM
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
## 2018-06-07 DIAGNOSIS — M79.7: ICD-10-CM

## 2018-06-07 DIAGNOSIS — Z88.5: ICD-10-CM

## 2018-06-07 DIAGNOSIS — Z86.718: ICD-10-CM

## 2018-06-07 DIAGNOSIS — G89.29: ICD-10-CM

## 2018-06-07 PROCEDURE — 99284 EMERGENCY DEPT VISIT MOD MDM: CPT

## 2018-06-07 PROCEDURE — 72131 CT LUMBAR SPINE W/O DYE: CPT

## 2018-06-07 PROCEDURE — 96374 THER/PROPH/DIAG INJ IV PUSH: CPT

## 2018-06-07 PROCEDURE — 96375 TX/PRO/DX INJ NEW DRUG ADDON: CPT

## 2018-06-07 NOTE — PD
HPI


Chief Complaint:  Back/ Neck Pain or Injury


Time Seen by Provider:  14:08


Travel History


International Travel<30 days:  No


Contact w/Intl Traveler<30days:  No


Traveled to known affect area:  No





History of Present Illness


HPI


Patient states that when he bent down to reach something on the floor, he felt 

a popping sound, after which he has been in severe back pain.  Describes the 

pain as a sharp, nonradiating, 10 out of 10 pain.  Patient denies any urinary 

or fecal incontinence.  Patient also denies any tingling or numbness to saddle 

area.  Patient states that the pain is worsened by activity, but does not seem 

to have any alleviating factors.  Patient denies any associated factors such as 

fever, rash, headache, neck pain, chest pain, flank pain or abdominal pain.  

Patient also denies nausea vomiting diarrhea, sore throat/cough/runny nose.








States allergy to oxycodone which makes him itchy, Neurontin makes her nauseous


Past medical history significant for 4 different back surgeries, migraine, MI, 

hypertension, DVT, ulcer, vasectomy, appendectomy, discectomy status post fusion

, status post hardware removal status post, s/p spinal stimulator placement





PFSH


Past Medical History


Hx Anticoagulant Therapy:  No


Arthritis:  Yes


Asthma:  No


Anxiety:  Yes


Depression:  Yes


Heart Rhythm Problems:  No


Cancer:  No


Cardiovascular Problems:  Yes


High Cholesterol:  No


Chemotherapy:  No


Chest Pain:  Yes


Congestive Heart Failure:  No


COPD:  No


Cerebrovascular Accident:  No


Diabetes:  No


Diminished Hearing:  No


Deep Vein Thrombosis:  Yes


Endocrine:  No


Fibromyalgia:  Yes


Gastrointestinal Disorders:  Yes (HX OF PROBLEMS WITH RECTAL BLEEDING)


GERD:  Yes


Genitourinary:  No


Headaches:  Yes


Hiatal Hernia:  Yes


Hypertension:  Yes


Immune Disorder:  No


Inguinal Hernia:  Yes


Implanted Vascular Access Dvce:  No


Musculoskeletal:  Yes


Neurologic:  Yes


Psychiatric:  Yes


Reproductive:  No


Respiratory:  No


Immunizations Current:  Yes


Migraines:  Yes


Seizures:  No


Sleep Apnea:  No


Ulcer:  Yes (NEWLY DIAGNOSED)


Tetanus Vaccination:  > 5 Years





Past Surgical History


Abdominal Surgery:  Yes (ABDOMINAL  LAPAROSCOPIC )


Appendectomy:  Yes (2005)


Genitourinary Surgery:  Yes (VASECTOMY)


Hysterectomy:  No


Other Surgery:  Yes (HEMORRHOID SURGIES 2005, 2007, 2010, 2011, SINUS SURGERY, 

RT FOOT SX)





Family History


Family Myocardial Infarction:  Yes





Social History


Alcohol Use:  Yes (occ)


Tobacco Use:  No


Substance Use:  No





Allergies-Medications


(Allergen,Severity, Reaction):  


Coded Allergies:  


     gabapentin (Unverified  Allergy, Intermediate, VOMITING, 6/7/18)


     oxycodone (Unverified  Allergy, Mild, ITCHY, 6/7/18)


Reported Meds & Prescriptions





Reported Meds & Active Scripts


Active


Reported


Effexor (Venlafaxine HCl) 75 Mg Tab 225 Mg PO DAILY


Miralax Powder (Polyethylene Glycol 3350 Powder) 17 Gm Powd 17 Gm PO DAILY


     Mix and dissolve one measuring cap-ful (17 grams) in water or juice.


Oxybutynin ER 24 HR (Oxybutynin Chloride) 15 Mg Tab 15 Mg PO DAILY


Metoclopramide (Metoclopramide HCl) 10 Mg Tab 10 Mg PO QID PRN


Flonase Nasal Spray (Fluticasone Nasal Spray) 50 Mcg/Act Spray 2 [place] EACH 

NARE DAILY


Colace (Docusate Sodium) 100 Mg Capsule 100 Mg PO BID PRN


Vitamin D-1000 (Cholecalciferol) 1,000 Unit Tab 2,000 Units PO BID


Refresh Plus Unit-Dose 0.5% Opth (Carboxymethylcellulose Sodium 0.5% Opth) 0.5% 

Drops 1 Drop EACH EYE QID PRN


Amlodipine (Amlodipine Besylate) 2.5 Mg Tab 2.5 Mg PO DAILY


Alfuzosin HCl ER (Alfuzosin HCl) 10 Mg Tab 10 Mg PO DAILY


Ventolin Hfa 18 GM Inh (Albuterol Sulfate) 90 Mcg/Act Aer 2 Puff INH QID PRN


Prazosin (Prazosin HCl) 5 Mg Cap 5 Mg PO HS


Topamax (Topiramate) 50 Mg Tab 50 Mg PO HS


Imitrex (Sumatriptan Succinate) 50 Mg Tab 50 Mg PO DAILY PRN


     If a satisfactory response has not been obtained at 2 hours, a second


     dose may be administered


Sennosides 8.6 Mg Tab 8.6 Mg PO HS


Lyrica (Pregabalin) 150 Mg Cap 150 Mg PO BID


Pantoprazole (Pantoprazole Sodium) 40 Mg Tab 40 Mg PO BID


Mobic (Meloxicam) 15 Mg Tab 15 Mg PO DAILY


ZyrTEC Itchy Eye Opth Drops (Ketotifen Opth Drops) 0.025% Drops 1 Drop EACH EYE 

TID


Proctofoam Topical (Rectal Foam) (Pramoxine Topical (Rectal Foam)) 1% Foam 1 

Applic TOPICAL QID PRN


Cetirizine (Cetirizine HCl) 10 Mg Tab 10 Mg PO DAILY


Carafate Liq (Sucralfate) 1 Gm/10 Ml Susp 1 Gm PO QID


     on empty stomach


Baclofen 10 Mg Tab 15 Mg PO TID PRN








Review of Systems


Musculoskeletal:  Positive: Pain (Low back pain)





Physical Exam


Narrative


GENERAL: 


SKIN: Warm and dry.


HEAD: Atraumatic. Normocephalic. 


EYES: Pupils equal and round. No scleral icterus. No injection or drainage. 


ENT: No nasal bleeding or discharge.  Mucous membranes pink and moist.


NECK: Trachea midline. No JVD. 


CARDIOVASCULAR: Regular rate and rhythm.  


RESPIRATORY: No accessory muscle use. Clear to auscultation. Breath sounds 

equal bilaterally. 


GASTROINTESTINAL: Abdomen soft, non-tender, nondistended. 


MUSCULOSKELETAL: Extremities without clubbing, cyanosis, or edema. No obvious 

deformities.  However the patient does have paraspinal spasms in his lower back

, negative straight leg raise test, negative contralateral SLR.  Pulses and 

neurovascular intact distally.  Intact cremasteric reflex, negative evidence of 

any urinary or fecal incontinence


NEUROLOGICAL: Awake and alert. No obvious cranial nerve deficits.  Motor 

grossly within normal limits. Five out of 5 muscle strength in the arms and 

legs.  Normal speech.


PSYCHIATRIC: Appropriate mood and affect; insight and judgment normal.





Data


Data


Last Documented VS





Vital Signs








  Date Time  Temp Pulse Resp B/P (MAP) Pulse Ox O2 Delivery O2 Flow Rate FiO2


 


6/7/18 11:18 98.2 76 22 150/89 (109) 97   








Orders





 Orders


Ct Lumb Spine W/O Contrast (6/7/18 14:08)


Ketorolac Inj (Toradol Inj) (6/7/18 15:00)


Lorazepam Inj (Ativan Inj) (6/7/18 15:00)


Hydromorphone Pf Inj (Dilaudid Pf Inj) (6/7/18 15:00)








MDM


Medical Decision Making


Medical Screen Exam Complete:  Yes


Emergency Medical Condition:  Yes


Medical Record Reviewed:  Yes


Differential Diagnosis


Fracture versus dislocation versus hardware involvement versus muscle spasm


Narrative Course


Lumbar CT spine was read by radiologist as no acute findings.... Noted previous 

laminectomy and interspace fusion at L4-L5, normal alignment throughout, no 

evidence of fracture or bony destructive changes.  No evidence of foraminal or 

bony canal compromise.  Minimal annular disc bulge and slightly dorsal 

protrusion at L3-L4 without significant canal or foraminal compromise, no 

evidence of paraspinal mass or collection.





Diagnosis





 Primary Impression:  


 Acute exacerbation of chronic low back pain


Patient Instructions:  Acute Low Back Pain (ED), General Instructions, Narcotic 

given in the ED


Disposition:  01 DISCHARGE HOME


Condition:  Stable











Otoniel Billingsley MD Jun 7, 2018 14:19

## 2018-06-07 NOTE — RADRPT
EXAM DATE:  6/7/2018 2:59 PM EDT

AGE/SEX:        52 years / Male



INDICATIONS:  Lower back pain.



CLINICAL DATA:  This is the patient's initial encounter. Patient reports that signs and symptoms have
 been present for 1 day and indicates a pain score of 5/10. 

                                                                          

MEDICAL/SURGICAL HISTORY:   Cardiovascular disease. None. Lumbar surgery x 4



RADIATION DOSE:  26.78 CTDI (mGy)







COMPARISON:      [facility] – [place], CT LUMBAR SPINE W/O CONTRAST, 4/7/2016.  . 









TECHNIQUE:  Contiguous axial images were acquired with a multirow detector CT scanner without contras
t.  Multiplanar reconstructions in the sagittal and coronal plane were also performed. Using automate
d exposure control and adjustment of the mA and/or kV according to patient size, radiation dose was k
ept as low as reasonably achievable to obtain optimal diagnostic quality images.



FINDINGS:  

Stable previous laminectomy and interspace fusion at L4-5. Normal alignment throughout. No evidence o
f fracture or bony destructive change. No evidence of bony canal or foraminal compromise. Minimal max
ular disc bulge and slight dorsal protrusion at L3-4 without significant canal or foraminal compromis
e. No evidence of paraspinal mass, collection.



CONCLUSION:

No acute findings.



Electronically signed by: Singh Verduzco MD  6/7/2018 3:11 PM EDT

## 2022-02-10 NOTE — RADRPT
EXAM DATE/TIME:  04/11/2018 09:20 

 

[facility] COMPARISON:     

CHEST SINGLE AP, August 20, 2017, 19:19.

 

                     

INDICATIONS :     

Syncope. Fall.

                     

 

MEDICAL HISTORY :     

Hypertension.  Ulcers.  Hiatal hernia.   Deep veinous thrombosis.   

 

SURGICAL HISTORY :     

Appendectomy.   Pain stimulator.

 

ENCOUNTER:     

Initial                                        

 

ACUITY:     

1 day      

 

PAIN SCORE:     

9/10

 

LOCATION:     

Right  shoulder

 

FINDINGS:     

A single view of the chest demonstrates the lungs to be symmetrically aerated without evidence of mas
s, infiltrate or effusion.  Spinal stimulator is noted. The cardiomediastinal contours are unremarkab
le.  Osseous structures are intact.

 

CONCLUSION:     

Negative for acute process.

 

 

 

 Saw Landis MD FACR on April 11, 2018 at 9:56           

Board Certified Radiologist.

 This report was verified electronically. 55